# Patient Record
Sex: FEMALE | Race: WHITE | NOT HISPANIC OR LATINO | Employment: OTHER | URBAN - METROPOLITAN AREA
[De-identification: names, ages, dates, MRNs, and addresses within clinical notes are randomized per-mention and may not be internally consistent; named-entity substitution may affect disease eponyms.]

---

## 2018-07-03 LAB
CHOLEST SERPL-MSCNC: 161 MG/DL (ref 0–200)
CHOLESTEROL/HDL RATIO SCREEN: 2.3
HDLC SERPL-MCNC: 71 MG/DL (ref 35–70)
LDL/HDL RATIO: 1.1
LDLC SERPL CALC-MCNC: 76 MG/DL (ref 0–160)
NON HDL CHOL (CALC): 90
TRIGL SERPL-MCNC: 68 MG/DL (ref 40–160)

## 2019-06-25 DIAGNOSIS — Z79.899 LONG TERM USE OF DRUG: ICD-10-CM

## 2019-06-25 DIAGNOSIS — E03.9 HYPOTHYROIDISM, UNSPECIFIED TYPE: Primary | ICD-10-CM

## 2019-06-25 PROBLEM — M19.90 OSTEOARTHRITIS: Status: ACTIVE | Noted: 2019-06-25

## 2019-06-25 PROBLEM — K57.30 DIVERTICULAR DISEASE OF COLON: Status: ACTIVE | Noted: 2019-06-25

## 2019-06-25 PROBLEM — G47.00 INSOMNIA: Status: ACTIVE | Noted: 2019-06-25

## 2019-06-25 PROBLEM — F41.1 ANXIETY STATE: Status: ACTIVE | Noted: 2019-06-25

## 2019-06-25 PROBLEM — I65.29 CAROTID ARTERY OCCLUSION: Status: ACTIVE | Noted: 2019-06-25

## 2019-06-25 PROBLEM — H40.9 GLAUCOMA: Status: ACTIVE | Noted: 2019-06-25

## 2019-06-26 LAB
ALBUMIN SERPL-MCNC: 4.5 G/DL (ref 3.6–5.1)
ALBUMIN/GLOB SERPL: 2.1 (CALC) (ref 1–2.5)
ALP SERPL-CCNC: 58 U/L (ref 33–130)
ALT SERPL-CCNC: 35 U/L (ref 6–29)
AST SERPL-CCNC: 25 U/L (ref 10–35)
BASOPHILS # BLD AUTO: 37 CELLS/UL (ref 0–200)
BASOPHILS NFR BLD AUTO: 0.6 %
BILIRUB SERPL-MCNC: 0.6 MG/DL (ref 0.2–1.2)
BUN SERPL-MCNC: 14 MG/DL (ref 7–25)
BUN/CREAT SERPL: ABNORMAL (CALC) (ref 6–22)
CALCIUM SERPL-MCNC: 9.7 MG/DL (ref 8.6–10.4)
CHLORIDE SERPL-SCNC: 103 MMOL/L (ref 98–110)
CO2 SERPL-SCNC: 33 MMOL/L (ref 20–32)
CREAT SERPL-MCNC: 0.77 MG/DL (ref 0.6–0.93)
EOSINOPHIL # BLD AUTO: 81 CELLS/UL (ref 15–500)
EOSINOPHIL NFR BLD AUTO: 1.3 %
ERYTHROCYTE [DISTWIDTH] IN BLOOD BY AUTOMATED COUNT: 12.9 % (ref 11–15)
GFRSERPLBLD MDRD-ARVRAT: 76 ML/MIN/1.73M2
GLOBULIN SER CALC-MCNC: 2.1 G/DL (CALC) (ref 1.9–3.7)
GLUCOSE SERPL-MCNC: 98 MG/DL (ref 65–99)
HCT VFR BLD AUTO: 44.3 % (ref 35–45)
HGB BLD-MCNC: 14.9 G/DL (ref 11.7–15.5)
LYMPHOCYTES # BLD AUTO: 2114 CELLS/UL (ref 850–3900)
LYMPHOCYTES NFR BLD AUTO: 34.1 %
MCH RBC QN AUTO: 29.9 PG (ref 27–33)
MCHC RBC AUTO-ENTMCNC: 33.6 G/DL (ref 32–36)
MCV RBC AUTO: 88.8 FL (ref 80–100)
MONOCYTES # BLD AUTO: 713 CELLS/UL (ref 200–950)
MONOCYTES NFR BLD AUTO: 11.5 %
NEUTROPHILS # BLD AUTO: 3255 CELLS/UL (ref 1500–7800)
NEUTROPHILS NFR BLD AUTO: 52.5 %
PLATELET # BLD AUTO: 353 THOUSAND/UL (ref 140–400)
PMV BLD REES-ECKER: 9.3 FL (ref 7.5–12.5)
POTASSIUM SERPL-SCNC: 4.6 MMOL/L (ref 3.5–5.3)
PROT SERPL-MCNC: 6.6 G/DL (ref 6.1–8.1)
RBC # BLD AUTO: 4.99 MILLION/UL (ref 3.8–5.1)
SODIUM SERPL-SCNC: 141 MMOL/L (ref 135–146)
TSH SERPL-ACNC: 2.58 MIU/L (ref 0.4–4.5)
WBC # BLD AUTO: 6.2 THOUSAND/UL (ref 3.8–10.8)

## 2019-07-02 ENCOUNTER — OFFICE VISIT (OUTPATIENT)
Dept: PRIMARY CARE CLINIC | Facility: CLINIC | Age: 74
End: 2019-07-02
Payer: MEDICARE

## 2019-07-02 VITALS
BODY MASS INDEX: 27.78 KG/M2 | HEIGHT: 68 IN | DIASTOLIC BLOOD PRESSURE: 78 MMHG | SYSTOLIC BLOOD PRESSURE: 110 MMHG | WEIGHT: 183.31 LBS

## 2019-07-02 DIAGNOSIS — G47.00 INSOMNIA, UNSPECIFIED TYPE: ICD-10-CM

## 2019-07-02 DIAGNOSIS — E03.9 HYPOTHYROIDISM, UNSPECIFIED TYPE: Primary | ICD-10-CM

## 2019-07-02 DIAGNOSIS — I10 ESSENTIAL HYPERTENSION: ICD-10-CM

## 2019-07-02 PROCEDURE — 99214 PR OFFICE/OUTPT VISIT, EST, LEVL IV, 30-39 MIN: ICD-10-PCS | Mod: S$GLB,,, | Performed by: FAMILY MEDICINE

## 2019-07-02 PROCEDURE — 99214 OFFICE O/P EST MOD 30 MIN: CPT | Mod: S$GLB,,, | Performed by: FAMILY MEDICINE

## 2019-07-02 RX ORDER — LISINOPRIL 20 MG/1
TABLET ORAL
COMMUNITY
Start: 2019-04-22 | End: 2020-01-15

## 2019-07-02 RX ORDER — LEVOTHYROXINE SODIUM 125 UG/1
125 TABLET ORAL
COMMUNITY
Start: 2019-07-01

## 2019-07-02 NOTE — PROGRESS NOTES
Subjective:       Patient ID: Grace Arrieta is a 74 y.o. female.    Chief Complaint: Annual Exam    Patient presents for follow-up on her chronic conditions.  She has history hypothyroidism.  This has been well controlled on levothyroxine without side effects.  Also with history of hypertension.  This has been well controlled on lisinopril without side effects.  She is here for recheck.  Also with history of insomnia.  She has not wanted to take any medications for this and this comes and goes.  She recently lost her  to cancer and this has worsened her insomnia.  She has history of anxiety as well.  She states she has much social support with friends and family.  She think she is doing fairly well.      Review of Systems   Constitutional: Negative for chills, fatigue, fever and unexpected weight change.   Eyes: Negative for visual disturbance.   Respiratory: Negative for cough, shortness of breath and wheezing.    Cardiovascular: Negative for chest pain and palpitations.   Gastrointestinal: Negative for abdominal pain and blood in stool.   Genitourinary: Negative for difficulty urinating and dysuria.   Musculoskeletal: Negative for back pain.   Skin: Negative for rash.   Neurological: Negative for dizziness, syncope, light-headedness, numbness and headaches.   Hematological: Negative for adenopathy.   Psychiatric/Behavioral: Positive for sleep disturbance. Negative for dysphoric mood. The patient is not nervous/anxious.        Objective:      Physical Exam   Constitutional: She is oriented to person, place, and time. She appears well-developed and well-nourished.   HENT:   Head: Normocephalic and atraumatic.   Eyes: Conjunctivae and EOM are normal.   Neck: Neck supple. No thyromegaly present.   Cardiovascular: Normal rate, regular rhythm and intact distal pulses.   No murmur heard.  Pulmonary/Chest: Effort normal and breath sounds normal.   Abdominal: Soft. Bowel sounds are normal. She exhibits no mass.  There is no tenderness. There is no rebound and no guarding.   Musculoskeletal: Normal range of motion.   Neurological: She is alert and oriented to person, place, and time.   Skin: Skin is dry. No rash noted.   Psychiatric: She has a normal mood and affect.   Vitals reviewed.      Assessment:       1. Hypothyroidism, unspecified type    2. Essential hypertension    3. Insomnia, unspecified type        Plan:       Hypothyroidism, unspecified type    Essential hypertension    Insomnia, unspecified type              DISCUSSION:  Labs are good.  Continue medications.  She does not want any other medications at it at this time.

## 2020-01-15 RX ORDER — LISINOPRIL 20 MG/1
TABLET ORAL
Qty: 90 TABLET | Refills: 3 | Status: SHIPPED | OUTPATIENT
Start: 2020-01-15 | End: 2020-11-30

## 2020-04-08 ENCOUNTER — PATIENT OUTREACH (OUTPATIENT)
Dept: ADMINISTRATIVE | Facility: OTHER | Age: 75
End: 2020-04-08

## 2020-06-30 DIAGNOSIS — E03.9 HYPOTHYROIDISM, UNSPECIFIED TYPE: Primary | ICD-10-CM

## 2020-06-30 DIAGNOSIS — I10 ESSENTIAL HYPERTENSION: ICD-10-CM

## 2020-07-01 LAB
ALBUMIN SERPL-MCNC: 4.2 G/DL (ref 3.6–5.1)
ALBUMIN/GLOB SERPL: 1.9 (CALC) (ref 1–2.5)
ALP SERPL-CCNC: 56 U/L (ref 37–153)
ALT SERPL-CCNC: 21 U/L (ref 6–29)
AST SERPL-CCNC: 16 U/L (ref 10–35)
BASOPHILS # BLD AUTO: 41 CELLS/UL (ref 0–200)
BASOPHILS NFR BLD AUTO: 0.6 %
BILIRUB SERPL-MCNC: 0.5 MG/DL (ref 0.2–1.2)
BUN SERPL-MCNC: 20 MG/DL (ref 7–25)
BUN/CREAT SERPL: NORMAL (CALC) (ref 6–22)
CALCIUM SERPL-MCNC: 9.5 MG/DL (ref 8.6–10.4)
CHLORIDE SERPL-SCNC: 106 MMOL/L (ref 98–110)
CHOLEST SERPL-MCNC: 170 MG/DL
CHOLEST/HDLC SERPL: 2.9 (CALC)
CO2 SERPL-SCNC: 28 MMOL/L (ref 20–32)
CREAT SERPL-MCNC: 0.71 MG/DL (ref 0.6–0.93)
EOSINOPHIL # BLD AUTO: 159 CELLS/UL (ref 15–500)
EOSINOPHIL NFR BLD AUTO: 2.3 %
ERYTHROCYTE [DISTWIDTH] IN BLOOD BY AUTOMATED COUNT: 12.8 % (ref 11–15)
GFRSERPLBLD MDRD-ARVRAT: 83 ML/MIN/1.73M2
GLOBULIN SER CALC-MCNC: 2.2 G/DL (CALC) (ref 1.9–3.7)
GLUCOSE SERPL-MCNC: 93 MG/DL (ref 65–99)
HCT VFR BLD AUTO: 44 % (ref 35–45)
HDLC SERPL-MCNC: 58 MG/DL
HGB BLD-MCNC: 14.5 G/DL (ref 11.7–15.5)
LDLC SERPL CALC-MCNC: 89 MG/DL (CALC)
LYMPHOCYTES # BLD AUTO: 1946 CELLS/UL (ref 850–3900)
LYMPHOCYTES NFR BLD AUTO: 28.2 %
MCH RBC QN AUTO: 30.1 PG (ref 27–33)
MCHC RBC AUTO-ENTMCNC: 33 G/DL (ref 32–36)
MCV RBC AUTO: 91.5 FL (ref 80–100)
MONOCYTES # BLD AUTO: 731 CELLS/UL (ref 200–950)
MONOCYTES NFR BLD AUTO: 10.6 %
NEUTROPHILS # BLD AUTO: 4023 CELLS/UL (ref 1500–7800)
NEUTROPHILS NFR BLD AUTO: 58.3 %
NONHDLC SERPL-MCNC: 112 MG/DL (CALC)
PLATELET # BLD AUTO: 335 THOUSAND/UL (ref 140–400)
PMV BLD REES-ECKER: 9 FL (ref 7.5–12.5)
POTASSIUM SERPL-SCNC: 4.6 MMOL/L (ref 3.5–5.3)
PROT SERPL-MCNC: 6.4 G/DL (ref 6.1–8.1)
RBC # BLD AUTO: 4.81 MILLION/UL (ref 3.8–5.1)
SODIUM SERPL-SCNC: 141 MMOL/L (ref 135–146)
TRIGL SERPL-MCNC: 130 MG/DL
TSH SERPL-ACNC: 3.23 MIU/L (ref 0.4–4.5)
WBC # BLD AUTO: 6.9 THOUSAND/UL (ref 3.8–10.8)

## 2020-07-07 ENCOUNTER — OFFICE VISIT (OUTPATIENT)
Dept: PRIMARY CARE CLINIC | Facility: CLINIC | Age: 75
End: 2020-07-07
Payer: MEDICARE

## 2020-07-07 VITALS
TEMPERATURE: 98 F | HEIGHT: 68 IN | HEART RATE: 56 BPM | OXYGEN SATURATION: 98 % | DIASTOLIC BLOOD PRESSURE: 64 MMHG | BODY MASS INDEX: 27.43 KG/M2 | SYSTOLIC BLOOD PRESSURE: 108 MMHG | RESPIRATION RATE: 18 BRPM | WEIGHT: 181 LBS

## 2020-07-07 DIAGNOSIS — E03.9 HYPOTHYROIDISM, UNSPECIFIED TYPE: ICD-10-CM

## 2020-07-07 DIAGNOSIS — I10 ESSENTIAL HYPERTENSION: Primary | ICD-10-CM

## 2020-07-07 DIAGNOSIS — G47.00 INSOMNIA, UNSPECIFIED TYPE: ICD-10-CM

## 2020-07-07 PROCEDURE — 99214 OFFICE O/P EST MOD 30 MIN: CPT | Mod: S$GLB,,, | Performed by: FAMILY MEDICINE

## 2020-07-07 PROCEDURE — 99214 PR OFFICE/OUTPT VISIT, EST, LEVL IV, 30-39 MIN: ICD-10-PCS | Mod: S$GLB,,, | Performed by: FAMILY MEDICINE

## 2020-07-07 NOTE — PROGRESS NOTES
Subjective:       Patient ID: Grace Arrieta is a 75 y.o. female.    Chief Complaint: Annual Exam    Patient presents for follow-up on her chronic conditions.  She has history of hypertension.  She takes lisinopril 20 mg for this without side effects.  She is here for recheck.  Also with history of hypothyroidism.  She takes 125 mcg daily for this of levothyroxine.  She denies any side effects and thinks it is working well.  Also with history of insomnia.  She takes nothing for this because she is afraid of the side effects.  She states that she tosses and turns a lot.  She thinks this may be related takes and pains that she has in her hips and shoulders while sleeping.  She lost her  was 2 years ago now and is overall doing well but she still is adjusting to life alone.  She has many friends and family and she is very active.  She eats out at restaurants all the time with other people.  She has gained a little weight with this.      Review of Systems   Constitutional: Negative for chills, fatigue, fever and unexpected weight change.   Eyes: Negative for visual disturbance.   Respiratory: Negative for cough, shortness of breath and wheezing.    Cardiovascular: Negative for chest pain and palpitations.   Gastrointestinal: Negative for abdominal pain and blood in stool.   Genitourinary: Negative for difficulty urinating and dysuria.   Musculoskeletal: Positive for arthralgias. Negative for back pain.   Skin: Negative for rash.   Neurological: Negative for dizziness, syncope, light-headedness, numbness and headaches.   Hematological: Negative for adenopathy.   Psychiatric/Behavioral: Positive for sleep disturbance. Negative for dysphoric mood. The patient is not nervous/anxious.      Medication List with Changes/Refills   Current Medications    LEVOTHYROXINE (SYNTHROID) 125 MCG TABLET        LISINOPRIL (PRINIVIL,ZESTRIL) 20 MG TABLET    TAKE 1 TABLET BY MOUTH EVERY DAY         Objective:      /64 (BP  "Location: Left arm, Patient Position: Sitting, BP Method: Medium (Manual))   Pulse (!) 56   Temp 97.5 °F (36.4 °C)   Resp 18   Ht 5' 8" (1.727 m)   Wt 82.1 kg (181 lb)   SpO2 98%   BMI 27.52 kg/m²   Estimated body mass index is 27.52 kg/m² as calculated from the following:    Height as of this encounter: 5' 8" (1.727 m).    Weight as of this encounter: 82.1 kg (181 lb).  Physical Exam  Vitals signs reviewed.   Constitutional:       Appearance: Normal appearance. She is well-developed.   HENT:      Head: Normocephalic and atraumatic.   Eyes:      Conjunctiva/sclera: Conjunctivae normal.   Neck:      Musculoskeletal: Neck supple.      Thyroid: No thyromegaly.   Cardiovascular:      Rate and Rhythm: Normal rate and regular rhythm.      Heart sounds: No murmur.   Pulmonary:      Effort: Pulmonary effort is normal.      Breath sounds: Normal breath sounds.   Abdominal:      General: Bowel sounds are normal.      Palpations: Abdomen is soft. There is no mass.      Tenderness: There is no abdominal tenderness. There is no guarding or rebound.   Musculoskeletal: Normal range of motion.   Skin:     General: Skin is dry.      Findings: No rash.   Neurological:      Mental Status: She is alert and oriented to person, place, and time.   Psychiatric:         Mood and Affect: Mood normal.         Behavior: Behavior normal.         Thought Content: Thought content normal.         Judgment: Judgment normal.         Assessment:       Problem List Items Addressed This Visit        Cardiac/Vascular    Essential hypertension - Primary       Endocrine    Hypothyroidism       Other    Insomnia            Plan:       Essential hypertension    Hypothyroidism, unspecified type    Insomnia, unspecified type              DISCUSSION:  Labs look great.  Healthy diet and exercise discussed.  Continue to go out and do things with friends and family.  I feel overall she is doing very well.  Continue current medications.  Return to clinic in " 1 year.      Results for orders placed or performed in visit on 06/30/20   TSH   Result Value Ref Range    TSH 3.23 0.40 - 4.50 mIU/L   Comprehensive metabolic panel   Result Value Ref Range    Glucose 93 65 - 99 mg/dL    BUN, Bld 20 7 - 25 mg/dL    Creatinine 0.71 0.60 - 0.93 mg/dL    eGFR if non  83 > OR = 60 mL/min/1.73m2    eGFR if African American 96 > OR = 60 mL/min/1.73m2    BUN/Creatinine Ratio NOT APPLICABLE 6 - 22 (calc)    Sodium 141 135 - 146 mmol/L    Potassium 4.6 3.5 - 5.3 mmol/L    Chloride 106 98 - 110 mmol/L    CO2 28 20 - 32 mmol/L    Calcium 9.5 8.6 - 10.4 mg/dL    Total Protein 6.4 6.1 - 8.1 g/dL    Albumin 4.2 3.6 - 5.1 g/dL    Globulin, Total 2.2 1.9 - 3.7 g/dL (calc)    Albumin/Globulin Ratio 1.9 1.0 - 2.5 (calc)    Total Bilirubin 0.5 0.2 - 1.2 mg/dL    Alkaline Phosphatase 56 37 - 153 U/L    AST 16 10 - 35 U/L    ALT 21 6 - 29 U/L   CBC auto differential   Result Value Ref Range    WBC 6.9 3.8 - 10.8 Thousand/uL    RBC 4.81 3.80 - 5.10 Million/uL    Hemoglobin 14.5 11.7 - 15.5 g/dL    Hematocrit 44.0 35.0 - 45.0 %    Mean Corpuscular Volume 91.5 80.0 - 100.0 fL    Mean Corpuscular Hemoglobin 30.1 27.0 - 33.0 pg    Mean Corpuscular Hemoglobin Conc 33.0 32.0 - 36.0 g/dL    RDW 12.8 11.0 - 15.0 %    Platelets 335 140 - 400 Thousand/uL    MPV 9.0 7.5 - 12.5 fL    Neutrophils Absolute 4,023 1,500 - 7,800 cells/uL    Lymph # 1,946 850 - 3,900 cells/uL    Mono # 731 200 - 950 cells/uL    Eos # 159 15 - 500 cells/uL    Baso # 41 0 - 200 cells/uL    Neutrophils Relative 58.3 %    Lymph% 28.2 %    Mono% 10.6 %    Eosinophil% 2.3 %    Basophil% 0.6 %   Lipid panel   Result Value Ref Range    Cholesterol 170 <200 mg/dL    HDL 58 > OR = 50 mg/dL    Triglycerides 130 <150 mg/dL    LDL Cholesterol 89 mg/dL (calc)    Hdl/Cholesterol Ratio 2.9 <5.0 (calc)    Non HDL Chol. (LDL+VLDL) 112 <130 mg/dL (calc)

## 2021-04-19 ENCOUNTER — OFFICE VISIT (OUTPATIENT)
Dept: PRIMARY CARE CLINIC | Facility: CLINIC | Age: 76
End: 2021-04-19
Payer: MEDICARE

## 2021-04-19 VITALS
HEART RATE: 73 BPM | OXYGEN SATURATION: 99 % | SYSTOLIC BLOOD PRESSURE: 135 MMHG | RESPIRATION RATE: 18 BRPM | WEIGHT: 184.38 LBS | BODY MASS INDEX: 27.94 KG/M2 | HEIGHT: 68 IN | DIASTOLIC BLOOD PRESSURE: 64 MMHG

## 2021-04-19 DIAGNOSIS — R07.9 CHEST PAIN, UNSPECIFIED TYPE: Primary | ICD-10-CM

## 2021-04-19 DIAGNOSIS — E03.9 HYPOTHYROIDISM, UNSPECIFIED TYPE: ICD-10-CM

## 2021-04-19 DIAGNOSIS — I10 ESSENTIAL HYPERTENSION: ICD-10-CM

## 2021-04-19 DIAGNOSIS — K21.9 GASTROESOPHAGEAL REFLUX DISEASE, UNSPECIFIED WHETHER ESOPHAGITIS PRESENT: ICD-10-CM

## 2021-04-19 DIAGNOSIS — F41.9 ANXIETY: ICD-10-CM

## 2021-04-19 PROCEDURE — 99214 PR OFFICE/OUTPT VISIT, EST, LEVL IV, 30-39 MIN: ICD-10-PCS | Mod: S$GLB,,, | Performed by: FAMILY MEDICINE

## 2021-04-19 PROCEDURE — 99214 OFFICE O/P EST MOD 30 MIN: CPT | Mod: S$GLB,,, | Performed by: FAMILY MEDICINE

## 2021-04-19 RX ORDER — CLONAZEPAM 1 MG/1
1 TABLET ORAL DAILY
Qty: 30 TABLET | Refills: 5 | Status: SHIPPED | OUTPATIENT
Start: 2021-04-19 | End: 2022-10-31

## 2021-04-20 RX ORDER — OMEPRAZOLE 20 MG/1
20 CAPSULE, DELAYED RELEASE ORAL DAILY
Qty: 90 CAPSULE | Refills: 3 | Status: SHIPPED | OUTPATIENT
Start: 2021-04-20 | End: 2022-03-14

## 2021-04-20 RX ORDER — OMEPRAZOLE 20 MG/1
20 CAPSULE, DELAYED RELEASE ORAL DAILY
COMMUNITY
End: 2021-04-20 | Stop reason: SDUPTHER

## 2021-10-12 DIAGNOSIS — E78.2 MIXED HYPERLIPIDEMIA: ICD-10-CM

## 2021-10-12 DIAGNOSIS — I10 ESSENTIAL HYPERTENSION: Primary | ICD-10-CM

## 2021-10-12 DIAGNOSIS — E03.9 HYPOTHYROIDISM, UNSPECIFIED TYPE: ICD-10-CM

## 2021-10-13 LAB
ALBUMIN SERPL-MCNC: 4.3 G/DL (ref 3.6–5.1)
ALBUMIN/GLOB SERPL: 1.9 (CALC) (ref 1–2.5)
ALP SERPL-CCNC: 67 U/L (ref 37–153)
ALT SERPL-CCNC: 22 U/L (ref 6–29)
AST SERPL-CCNC: 17 U/L (ref 10–35)
BASOPHILS # BLD AUTO: 43 CELLS/UL (ref 0–200)
BASOPHILS NFR BLD AUTO: 0.6 %
BILIRUB SERPL-MCNC: 0.6 MG/DL (ref 0.2–1.2)
BUN SERPL-MCNC: 20 MG/DL (ref 7–25)
BUN/CREAT SERPL: NORMAL (CALC) (ref 6–22)
CALCIUM SERPL-MCNC: 9.6 MG/DL (ref 8.6–10.4)
CHLORIDE SERPL-SCNC: 105 MMOL/L (ref 98–110)
CHOLEST SERPL-MCNC: 159 MG/DL
CHOLEST/HDLC SERPL: 2.6 (CALC)
CO2 SERPL-SCNC: 27 MMOL/L (ref 20–32)
CREAT SERPL-MCNC: 0.82 MG/DL (ref 0.6–0.93)
EOSINOPHIL # BLD AUTO: 121 CELLS/UL (ref 15–500)
EOSINOPHIL NFR BLD AUTO: 1.7 %
ERYTHROCYTE [DISTWIDTH] IN BLOOD BY AUTOMATED COUNT: 13.3 % (ref 11–15)
GLOBULIN SER CALC-MCNC: 2.3 G/DL (CALC) (ref 1.9–3.7)
GLUCOSE SERPL-MCNC: 96 MG/DL (ref 65–99)
HCT VFR BLD AUTO: 44.5 % (ref 35–45)
HDLC SERPL-MCNC: 61 MG/DL
HGB BLD-MCNC: 14.4 G/DL (ref 11.7–15.5)
LDLC SERPL CALC-MCNC: 78 MG/DL (CALC)
LYMPHOCYTES # BLD AUTO: 1917 CELLS/UL (ref 850–3900)
LYMPHOCYTES NFR BLD AUTO: 27 %
MCH RBC QN AUTO: 29.6 PG (ref 27–33)
MCHC RBC AUTO-ENTMCNC: 32.4 G/DL (ref 32–36)
MCV RBC AUTO: 91.4 FL (ref 80–100)
MONOCYTES # BLD AUTO: 788 CELLS/UL (ref 200–950)
MONOCYTES NFR BLD AUTO: 11.1 %
NEUTROPHILS # BLD AUTO: 4232 CELLS/UL (ref 1500–7800)
NEUTROPHILS NFR BLD AUTO: 59.6 %
NONHDLC SERPL-MCNC: 98 MG/DL (CALC)
PLATELET # BLD AUTO: 328 THOUSAND/UL (ref 140–400)
PMV BLD REES-ECKER: 9.4 FL (ref 7.5–12.5)
POTASSIUM SERPL-SCNC: 4.6 MMOL/L (ref 3.5–5.3)
PROT SERPL-MCNC: 6.6 G/DL (ref 6.1–8.1)
RBC # BLD AUTO: 4.87 MILLION/UL (ref 3.8–5.1)
SODIUM SERPL-SCNC: 140 MMOL/L (ref 135–146)
TRIGL SERPL-MCNC: 115 MG/DL
TSH SERPL-ACNC: 2.13 MIU/L (ref 0.4–4.5)
WBC # BLD AUTO: 7.1 THOUSAND/UL (ref 3.8–10.8)

## 2021-10-19 ENCOUNTER — OFFICE VISIT (OUTPATIENT)
Dept: PRIMARY CARE CLINIC | Facility: CLINIC | Age: 76
End: 2021-10-19
Payer: MEDICARE

## 2021-10-19 VITALS
TEMPERATURE: 97 F | HEIGHT: 68 IN | BODY MASS INDEX: 28.64 KG/M2 | RESPIRATION RATE: 16 BRPM | DIASTOLIC BLOOD PRESSURE: 80 MMHG | WEIGHT: 189 LBS | SYSTOLIC BLOOD PRESSURE: 142 MMHG | OXYGEN SATURATION: 99 % | HEART RATE: 57 BPM

## 2021-10-19 DIAGNOSIS — K21.9 GASTROESOPHAGEAL REFLUX DISEASE, UNSPECIFIED WHETHER ESOPHAGITIS PRESENT: ICD-10-CM

## 2021-10-19 DIAGNOSIS — E03.9 HYPOTHYROIDISM, UNSPECIFIED TYPE: ICD-10-CM

## 2021-10-19 DIAGNOSIS — F41.9 ANXIETY: ICD-10-CM

## 2021-10-19 DIAGNOSIS — E78.2 MIXED HYPERLIPIDEMIA: ICD-10-CM

## 2021-10-19 DIAGNOSIS — I10 ESSENTIAL HYPERTENSION: Primary | ICD-10-CM

## 2021-10-19 PROCEDURE — 99214 PR OFFICE/OUTPT VISIT, EST, LEVL IV, 30-39 MIN: ICD-10-PCS | Mod: 25,S$GLB,, | Performed by: FAMILY MEDICINE

## 2021-10-19 PROCEDURE — G0008 FLU VACCINE - QUADRIVALENT - ADJUVANTED: ICD-10-PCS | Mod: S$GLB,,, | Performed by: FAMILY MEDICINE

## 2021-10-19 PROCEDURE — 90694 VACC AIIV4 NO PRSRV 0.5ML IM: CPT | Mod: S$GLB,,, | Performed by: FAMILY MEDICINE

## 2021-10-19 PROCEDURE — 90694 FLU VACCINE - QUADRIVALENT - ADJUVANTED: ICD-10-PCS | Mod: S$GLB,,, | Performed by: FAMILY MEDICINE

## 2021-10-19 PROCEDURE — 99214 OFFICE O/P EST MOD 30 MIN: CPT | Mod: 25,S$GLB,, | Performed by: FAMILY MEDICINE

## 2021-10-19 PROCEDURE — G0008 ADMIN INFLUENZA VIRUS VAC: HCPCS | Mod: S$GLB,,, | Performed by: FAMILY MEDICINE

## 2021-11-01 ENCOUNTER — CLINICAL SUPPORT (OUTPATIENT)
Dept: PRIMARY CARE CLINIC | Facility: CLINIC | Age: 76
End: 2021-11-01
Payer: MEDICARE

## 2021-11-01 DIAGNOSIS — Z23 NEED FOR PNEUMOCOCCAL VACCINE: Primary | ICD-10-CM

## 2021-11-01 PROCEDURE — 90732 PPSV23 VACC 2 YRS+ SUBQ/IM: CPT | Mod: S$GLB,,, | Performed by: FAMILY MEDICINE

## 2021-11-01 PROCEDURE — G0009 PNEUMOCOCCAL POLYSACCHARIDE VACCINE 23-VALENT =>2YO SQ IM: ICD-10-PCS | Mod: S$GLB,,, | Performed by: FAMILY MEDICINE

## 2021-11-01 PROCEDURE — 90732 PNEUMOCOCCAL POLYSACCHARIDE VACCINE 23-VALENT =>2YO SQ IM: ICD-10-PCS | Mod: S$GLB,,, | Performed by: FAMILY MEDICINE

## 2021-11-01 PROCEDURE — G0009 ADMIN PNEUMOCOCCAL VACCINE: HCPCS | Mod: S$GLB,,, | Performed by: FAMILY MEDICINE

## 2022-02-23 ENCOUNTER — OFFICE VISIT (OUTPATIENT)
Dept: PRIMARY CARE CLINIC | Facility: CLINIC | Age: 77
End: 2022-02-23
Payer: MEDICARE

## 2022-02-23 VITALS
OXYGEN SATURATION: 98 % | BODY MASS INDEX: 28.49 KG/M2 | HEIGHT: 68 IN | HEART RATE: 61 BPM | RESPIRATION RATE: 18 BRPM | WEIGHT: 188 LBS | DIASTOLIC BLOOD PRESSURE: 82 MMHG | SYSTOLIC BLOOD PRESSURE: 170 MMHG

## 2022-02-23 DIAGNOSIS — M54.41 ACUTE RIGHT-SIDED LOW BACK PAIN WITH RIGHT-SIDED SCIATICA: ICD-10-CM

## 2022-02-23 DIAGNOSIS — I10 ESSENTIAL HYPERTENSION: Primary | ICD-10-CM

## 2022-02-23 PROCEDURE — 99214 OFFICE O/P EST MOD 30 MIN: CPT | Mod: S$GLB,,, | Performed by: FAMILY MEDICINE

## 2022-02-23 PROCEDURE — 99214 PR OFFICE/OUTPT VISIT, EST, LEVL IV, 30-39 MIN: ICD-10-PCS | Mod: S$GLB,,, | Performed by: FAMILY MEDICINE

## 2022-02-23 RX ORDER — METHYLPREDNISOLONE 4 MG/1
TABLET ORAL
COMMUNITY
Start: 2022-02-21 | End: 2022-10-31

## 2022-02-23 RX ORDER — MELOXICAM 7.5 MG/1
TABLET ORAL
COMMUNITY
Start: 2022-02-21 | End: 2022-10-31

## 2022-02-23 NOTE — PROGRESS NOTES
Subjective:       Patient ID: Grace Arrieta is a 77 y.o. female.    Chief Complaint: Hospital Follow Up    Patient presents as a follow-up from ER visit due to elevated blood pressure.  She states that they gave her something to bring it down.  They told her to follow-up here.  She denies any chest pain shortness of breath headache passing out etcetera.  She states it has been better while at home but still elevated.  It is high here today.  The ER prescribed her lisinopril 20 mg. She denies any side effects.  She does not feel extra anxious, though she is still sad following the death of her  last year.  She has been having pain in her right leg that she feels is sciatica.  It begins in her right buttock and radiates down the leg.  This has been coming and going over the last week.  It worse when she sits for long periods.  She carries a soft pillow with her and uses this, as it helps.  No trauma recalled.    Review of Systems   Constitutional: Negative for chills, fatigue, fever and unexpected weight change.   Eyes: Negative for visual disturbance.   Respiratory: Negative for cough, shortness of breath and wheezing.    Cardiovascular: Negative for chest pain and palpitations.   Gastrointestinal: Negative for abdominal pain and blood in stool.   Genitourinary: Negative for difficulty urinating and dysuria.   Musculoskeletal: Positive for arthralgias (Right leg pain) and back pain (Right buttock and radiates down right leg.).   Skin: Negative for rash.   Neurological: Negative for dizziness, syncope, light-headedness, numbness and headaches.   Hematological: Negative for adenopathy.   Psychiatric/Behavioral: Negative for dysphoric mood. The patient is not nervous/anxious.      Medication List with Changes/Refills   Current Medications    CLONAZEPAM (KLONOPIN) 1 MG TABLET    Take 1 tablet (1 mg total) by mouth once daily. prn    LEVOTHYROXINE (SYNTHROID) 125 MCG TABLET    Take 125 mcg by mouth before  "breakfast.     LISINOPRIL (PRINIVIL,ZESTRIL) 20 MG TABLET    TAKE 1 TABLET BY MOUTH EVERY DAY    MELOXICAM (MOBIC) 7.5 MG TABLET        METHYLPREDNISOLONE (MEDROL DOSEPACK) 4 MG TABLET        OMEPRAZOLE (PRILOSEC) 20 MG CAPSULE    Take 1 capsule (20 mg total) by mouth once daily.         Objective:      BP (!) 170/82 (BP Location: Right arm, Patient Position: Sitting, BP Method: Large (Manual))   Pulse 61   Resp 18   Ht 5' 8" (1.727 m)   Wt 85.3 kg (188 lb)   SpO2 98%   BMI 28.59 kg/m²   Estimated body mass index is 28.59 kg/m² as calculated from the following:    Height as of this encounter: 5' 8" (1.727 m).    Weight as of this encounter: 85.3 kg (188 lb).  Physical Exam  Vitals reviewed.   Constitutional:       General: She is not in acute distress.     Appearance: Normal appearance. She is well-developed. She is not ill-appearing.   HENT:      Head: Normocephalic and atraumatic.   Eyes:      Conjunctiva/sclera: Conjunctivae normal.   Neck:      Thyroid: No thyromegaly.   Cardiovascular:      Rate and Rhythm: Normal rate and regular rhythm.      Heart sounds: No murmur heard.  Pulmonary:      Effort: Pulmonary effort is normal.      Breath sounds: Normal breath sounds.   Abdominal:      General: Bowel sounds are normal.      Palpations: Abdomen is soft. There is no mass.      Tenderness: There is no abdominal tenderness. There is no guarding or rebound.   Musculoskeletal:         General: Normal range of motion.      Cervical back: Neck supple.   Skin:     General: Skin is dry.      Findings: No rash.   Neurological:      Mental Status: She is alert and oriented to person, place, and time.      Comments: Negative straight leg raises   Psychiatric:         Mood and Affect: Mood normal.         Behavior: Behavior normal.         Thought Content: Thought content normal.         Judgment: Judgment normal.         Assessment:       Problem List Items Addressed This Visit        Cardiac/Vascular    Essential " hypertension - Primary      Other Visit Diagnoses     Acute right-sided low back pain with right-sided sciatica                Plan:       Essential hypertension    Acute right-sided low back pain with right-sided sciatica              DISCUSSION:  Continue to monitor blood pressure.  If these values stay high then contact us and I will change her lisinopril to lisinopril HCT 20/12.5.  Avoid sitting on any hard bench is.  Stretches demonstrated and recommended to perform 2-3 times per day.

## 2022-09-22 ENCOUNTER — PATIENT OUTREACH (OUTPATIENT)
Dept: ADMINISTRATIVE | Facility: HOSPITAL | Age: 77
End: 2022-09-22
Payer: MEDICARE

## 2022-10-24 ENCOUNTER — TELEPHONE (OUTPATIENT)
Dept: FAMILY MEDICINE | Facility: CLINIC | Age: 77
End: 2022-10-24
Payer: MEDICARE

## 2022-10-24 DIAGNOSIS — E78.5 HYPERLIPIDEMIA, UNSPECIFIED HYPERLIPIDEMIA TYPE: ICD-10-CM

## 2022-10-24 DIAGNOSIS — Z79.899 OTHER LONG TERM (CURRENT) DRUG THERAPY: ICD-10-CM

## 2022-10-24 DIAGNOSIS — I10 ESSENTIAL HYPERTENSION: Primary | ICD-10-CM

## 2022-10-24 DIAGNOSIS — R53.83 FATIGUE, UNSPECIFIED TYPE: ICD-10-CM

## 2022-10-24 DIAGNOSIS — E03.9 HYPOTHYROIDISM, UNSPECIFIED TYPE: ICD-10-CM

## 2022-10-24 DIAGNOSIS — R79.9 ABNORMAL FINDING OF BLOOD CHEMISTRY, UNSPECIFIED: ICD-10-CM

## 2022-10-25 ENCOUNTER — LAB VISIT (OUTPATIENT)
Dept: FAMILY MEDICINE | Facility: CLINIC | Age: 77
End: 2022-10-25
Payer: MEDICARE

## 2022-10-25 DIAGNOSIS — E03.9 HYPOTHYROIDISM, UNSPECIFIED TYPE: ICD-10-CM

## 2022-10-25 DIAGNOSIS — Z23 NEED FOR PROPHYLACTIC VACCINATION AND INOCULATION AGAINST INFLUENZA: Primary | ICD-10-CM

## 2022-10-25 DIAGNOSIS — I10 ESSENTIAL HYPERTENSION: ICD-10-CM

## 2022-10-25 DIAGNOSIS — E78.5 HYPERLIPIDEMIA, UNSPECIFIED HYPERLIPIDEMIA TYPE: ICD-10-CM

## 2022-10-25 DIAGNOSIS — Z79.899 OTHER LONG TERM (CURRENT) DRUG THERAPY: ICD-10-CM

## 2022-10-25 DIAGNOSIS — R79.9 ABNORMAL FINDING OF BLOOD CHEMISTRY, UNSPECIFIED: ICD-10-CM

## 2022-10-25 DIAGNOSIS — R53.83 FATIGUE, UNSPECIFIED TYPE: ICD-10-CM

## 2022-10-25 LAB
ABS NRBC COUNT: 0 X 10 3/UL (ref 0–0.01)
ABSOLUTE BASOPHIL: 0.03 X 10 3/UL (ref 0–0.22)
ABSOLUTE EOSINOPHIL: 0.16 X 10 3/UL (ref 0.04–0.54)
ABSOLUTE IMMATURE GRAN: 0.02 X 10 3/UL (ref 0–0.04)
ABSOLUTE LYMPHOCYTE: 2.28 X 10 3/UL (ref 0.86–4.75)
ABSOLUTE MONOCYTE: 0.82 X 10 3/UL (ref 0.22–1.08)
ALBUMIN SERPL-MCNC: 4.9 G/DL (ref 3.5–5.2)
ALP ISOS SERPL LEV INH-CCNC: 77 U/L (ref 35–105)
ALT (SGPT): 28 U/L (ref 0–33)
ANION GAP SERPL CALC-SCNC: 10 MMOL/L (ref 8–17)
AST SERPL-CCNC: 19 U/L (ref 0–32)
BASOPHILS NFR BLD: 0.4 % (ref 0.2–1.2)
BILIRUB CONJ+UNCONJ SERPL-MCNC: NORMAL MG/DL (ref 0.1–0.8)
BILIRUBIN DIRECT+TOT PNL SERPL-MCNC: <0.2 MG/DL (ref 0–0.3)
BILIRUBIN, TOTAL: 0.42 MG/DL (ref 0–1.2)
BUN/CREAT SERPL: 20.8 (ref 6–20)
CALCIUM SERPL-MCNC: 10.2 MG/DL (ref 8.6–10.2)
CARBON DIOXIDE, CO2: 30 MMOL/L (ref 22–29)
CHLORIDE: 103 MMOL/L (ref 98–107)
CHOLEST SERPL-MSCNC: 170 MG/DL (ref 100–200)
CREAT SERPL-MCNC: 0.77 MG/DL (ref 0.5–0.9)
EOSINOPHIL NFR BLD: 2.2 % (ref 0.7–7)
ESTIMATED AVERAGE GLUCOSE: 107 MG/DL
GFR ESTIMATION: 78.91
GLOBULIN: 1.9 G/DL (ref 1.5–4.5)
GLUCOSE: 95 MG/DL (ref 82–115)
HBA1C MFR BLD: 5.4 % (ref 4–6)
HCT VFR BLD AUTO: 45.9 % (ref 37–47)
HDLC SERPL-MCNC: 58 MG/DL
HGB BLD-MCNC: 15.2 G/DL (ref 12–16)
IMMATURE GRANULOCYTES: 0.3 % (ref 0–0.5)
LDL/HDL RATIO: 1.6 (ref 1–3)
LDLC SERPL CALC-MCNC: 90.6 MG/DL (ref 0–100)
LYMPHOCYTES NFR BLD: 31.2 % (ref 19.3–53.1)
MCH RBC QN AUTO: 29.6 PG (ref 27–32)
MCHC RBC AUTO-ENTMCNC: 33.1 G/DL (ref 32–36)
MCV RBC AUTO: 89.5 FL (ref 82–100)
MONOCYTES NFR BLD: 11.2 % (ref 4.7–12.5)
NEUTROPHILS # BLD AUTO: 4 X 10 3/UL (ref 2.15–7.56)
NEUTROPHILS NFR BLD: 54.7 % (ref 34–71.1)
NUCLEATED RED BLOOD CELLS: 0 /100 WBC (ref 0–0.2)
PLATELET # BLD AUTO: 328 X 10 3/UL (ref 135–400)
POTASSIUM: 4.5 MMOL/L (ref 3.5–5.1)
PROT SNV-MCNC: 6.8 G/DL (ref 6.4–8.3)
RBC # BLD AUTO: 5.13 X 10 6/UL (ref 4.2–5.4)
RDW-SD: 42.7 FL (ref 37–54)
SODIUM: 143 MMOL/L (ref 136–145)
TRIGL SERPL-MCNC: 107 MG/DL (ref 0–150)
TSH SERPL DL<=0.005 MIU/L-ACNC: 2.24 UIU/ML (ref 0.27–4.2)
UREA NITROGEN (BUN): 16 MG/DL (ref 8–23)
VITAMIN D (25OHD): 23 NG/ML
WBC # BLD: 7.31 X 10 3/UL (ref 4.3–10.8)

## 2022-10-25 PROCEDURE — 90694 VACC AIIV4 NO PRSRV 0.5ML IM: CPT | Mod: S$GLB,,, | Performed by: INTERNAL MEDICINE

## 2022-10-25 PROCEDURE — G0008 ADMIN INFLUENZA VIRUS VAC: HCPCS | Mod: S$GLB,,, | Performed by: INTERNAL MEDICINE

## 2022-10-25 PROCEDURE — G0008 FLU VACCINE - QUADRIVALENT - ADJUVANTED: ICD-10-PCS | Mod: S$GLB,,, | Performed by: INTERNAL MEDICINE

## 2022-10-25 PROCEDURE — 90694 FLU VACCINE - QUADRIVALENT - ADJUVANTED: ICD-10-PCS | Mod: S$GLB,,, | Performed by: INTERNAL MEDICINE

## 2022-10-31 ENCOUNTER — OFFICE VISIT (OUTPATIENT)
Dept: FAMILY MEDICINE | Facility: CLINIC | Age: 77
End: 2022-10-31
Payer: MEDICARE

## 2022-10-31 VITALS
OXYGEN SATURATION: 98 % | SYSTOLIC BLOOD PRESSURE: 138 MMHG | TEMPERATURE: 98 F | BODY MASS INDEX: 28.19 KG/M2 | HEART RATE: 70 BPM | DIASTOLIC BLOOD PRESSURE: 82 MMHG | HEIGHT: 68 IN | WEIGHT: 186 LBS

## 2022-10-31 DIAGNOSIS — E03.9 HYPOTHYROIDISM, UNSPECIFIED TYPE: ICD-10-CM

## 2022-10-31 DIAGNOSIS — M19.90 OSTEOARTHRITIS, UNSPECIFIED OSTEOARTHRITIS TYPE, UNSPECIFIED SITE: ICD-10-CM

## 2022-10-31 DIAGNOSIS — E78.5 HYPERLIPIDEMIA, UNSPECIFIED HYPERLIPIDEMIA TYPE: ICD-10-CM

## 2022-10-31 DIAGNOSIS — Z12.31 BREAST CANCER SCREENING BY MAMMOGRAM: ICD-10-CM

## 2022-10-31 DIAGNOSIS — Z00.00 HEALTH MAINTENANCE EXAMINATION: Primary | ICD-10-CM

## 2022-10-31 DIAGNOSIS — I10 ESSENTIAL HYPERTENSION: ICD-10-CM

## 2022-10-31 DIAGNOSIS — R53.83 FATIGUE, UNSPECIFIED TYPE: ICD-10-CM

## 2022-10-31 DIAGNOSIS — R73.01 ELEVATED FASTING GLUCOSE: ICD-10-CM

## 2022-10-31 DIAGNOSIS — E55.9 VITAMIN D DEFICIENCY: ICD-10-CM

## 2022-10-31 PROCEDURE — 99214 PR OFFICE/OUTPT VISIT, EST, LEVL IV, 30-39 MIN: ICD-10-PCS | Mod: S$GLB,,, | Performed by: INTERNAL MEDICINE

## 2022-10-31 PROCEDURE — 99214 OFFICE O/P EST MOD 30 MIN: CPT | Mod: S$GLB,,, | Performed by: INTERNAL MEDICINE

## 2022-10-31 NOTE — PROGRESS NOTES
"  Subjective:       Patient ID: Grace Arrieta is a 77 y.o. female.    Chief Complaint: Establish Care      HPI: Grace comes in today to reestablish.  She has been followed by Dr. Fernandez more recently.  Her  Jasen  about 4 years ago.  She says overall she is doing well but she has gained some weight.  She has history of hypertension and hypothyroidism.  She denies any history of heart troubles.  He says she is up-to-date on her colonoscopy.  We will set her up for mammogram.  She denies chest pains or shortness of breath.       Past Medical History:   Past Medical History:   Diagnosis Date    Hypertension     Thyroid disease        Past Surgical Historical:   Past Surgical History:   Procedure Laterality Date    CHOLECYSTECTOMY      HYSTERECTOMY      TONSILLECTOMY          Vitals: /82   Pulse 70   Temp 97.9 °F (36.6 °C) (Temporal)   Ht 5' 8" (1.727 m)   Wt 84.4 kg (186 lb)   SpO2 98%   BMI 28.28 kg/m²      Medications:   Medication List with Changes/Refills   Current Medications    LEVOTHYROXINE (SYNTHROID) 125 MCG TABLET    Take 125 mcg by mouth before breakfast.     LISINOPRIL (PRINIVIL,ZESTRIL) 20 MG TABLET    TAKE 1 TABLET BY MOUTH EVERY DAY    OMEPRAZOLE (PRILOSEC) 20 MG CAPSULE    TAKE 1 CAPSULE BY MOUTH EVERY DAY   Discontinued Medications    CLONAZEPAM (KLONOPIN) 1 MG TABLET    Take 1 tablet (1 mg total) by mouth once daily. prn    MELOXICAM (MOBIC) 7.5 MG TABLET        METHYLPREDNISOLONE (MEDROL DOSEPACK) 4 MG TABLET            Past Social History:   Social History     Socioeconomic History    Marital status:    Tobacco Use    Smoking status: Former    Smokeless tobacco: Never   Substance and Sexual Activity    Alcohol use: Yes    Drug use: Never    Sexual activity: Not Currently       Allergies: Review of patient's allergies indicates:  No Known Allergies     Family History:   Family History   Problem Relation Age of Onset    Cancer Mother         Review of " Systems:  Review of Systems   Respiratory:  Negative for shortness of breath and wheezing.    Cardiovascular:  Negative for chest pain and palpitations.   Gastrointestinal:  Negative for abdominal pain, change in bowel habit and change in bowel habit.   Musculoskeletal:  Negative for gait problem.   Neurological:  Negative for dizziness and syncope.   Psychiatric/Behavioral:  Negative for suicidal ideas.       Physical Exam:  Physical Exam  Constitutional:       Appearance: Normal appearance.   Cardiovascular:      Rate and Rhythm: Normal rate and regular rhythm.   Pulmonary:      Effort: Pulmonary effort is normal.      Breath sounds: Normal breath sounds.   Abdominal:      General: Abdomen is flat.      Palpations: Abdomen is soft.   Skin:     General: Skin is warm and dry.   Neurological:      General: No focal deficit present.      Mental Status: She is alert and oriented to person, place, and time.   Psychiatric:         Mood and Affect: Mood normal.        Labs:  Lab Visit on 10/25/2022   Component Date Value Ref Range Status    Glucose 10/25/2022 95  82 - 115 mg/dL Final    BUN 10/25/2022 16.0  8 - 23 mg/dL Final    Creatinine 10/25/2022 0.77  0.50 - 0.90 mg/dL Final    Calcium 10/25/2022 10.2  8.6 - 10.2 mg/dL Final    Sodium 10/25/2022 143  136 - 145 mmol/L Final    Potassium 10/25/2022 4.5  3.5 - 5.1 mmol/L Final    Chloride 10/25/2022 103  98 - 107 mmol/L Final    CO2 10/25/2022 30 (H)  22 - 29 mmol/L Final    BUN/Creatinine Ratio 10/25/2022 20.8 (H)  6 - 20 Final    GFR ESTIMATION 10/25/2022 78.91  >60.00 Final    Anion Gap 10/25/2022 10.0  8.0 - 17.0 mmol/L Final    WBC 10/25/2022 7.31  4.3 - 10.8 X 10 3/ul Final    RBC 10/25/2022 5.13  4.2 - 5.4 X 10 6/ul Final    RDW-SD 10/25/2022 42.7  37 - 54 fl Final    Hemoglobin 10/25/2022 15.2  12 - 16 g/dL Final    Hematocrit 10/25/2022 45.9  37 - 47 % Final    MCV 10/25/2022 89.5  82 - 100 fl Final    MCH 10/25/2022 29.6  27 - 32 pg Final    [facility] [place] 10/25/2022  33.1  32 - 36 g/dL Final    Platelets 10/25/2022 328  135 - 400 X 10 3/ul Final    Neutrophils 10/25/2022 54.7  34 - 71.1 % Final    Lymphocytes 10/25/2022 31.2  19.3 - 53.1 % Final    Monocytes 10/25/2022 11.2  4.7 - 12.5 % Final    Eosinophils 10/25/2022 2.2  0.7 - 7.0 % Final    Basophils 10/25/2022 0.4  0.2 - 1.2 % Final    Neutrophils Absolute 10/25/2022 4.00  2.15 - 7.56 X 10 3/ul Final    Lymphocytes Absolute 10/25/2022 2.28  0.86 - 4.75 X 10 3/ul Final    Monocytes Absolute 10/25/2022 0.82  0.22 - 1.08 X 10 3/ul Final    Eosinophils Absolute 10/25/2022 0.16  0.04 - 0.54 X 10 3/ul Final    Basophils Absolute 10/25/2022 0.03  0.00 - 0.22 X 10 3/ul Final    Immature Granulocytes Absolute 10/25/2022 0.02  0 - 0.04 X 10 3/ul Final    Immature Granulocytes 10/25/2022 0.3  0 - 0.5 % Final    nRBC# 10/25/2022 0.0  0 - 0.2 /100 WBC Final    nRBC Count Absolute 10/25/2022 0.000  0 - 0.012 x 10 3/ul Final    Hemoglobin A1C 10/25/2022 5.4  4.0 - 6.0 % Final    EST AVERAGE GLUCOSE 10/25/2022 107  NORMAL MG/DL Final    AST 10/25/2022 19  0 - 32 U/L Final    ALT (SGPT) 10/25/2022 28  0 - 33 U/L Final    Alkaline Phosphatase 10/25/2022 77  35 - 105 U/L Final    Protein, Total 10/25/2022 6.8  6.4 - 8.3 g/dL Final    Albumin 10/25/2022 4.9  3.5 - 5.2 g/dL Final    BILIRUBIN, TOTAL 10/25/2022 0.42  0.00 - 1.20 mg/dL Final    Bilirubin, Direct 10/25/2022 <0.20  0.00 - 0.30 mg/dL Final    Globulin 10/25/2022 1.9  1.5 - 4.5 g/dL Final    Bilirubin, Indirect 10/25/2022 Unable to Calculate  0.10 - 0.80 mg/dL Final    TSH 10/25/2022 2.24  0.27 - 4.20 uIU/mL Final    VITAMIN D (25OHD) 10/25/2022 23.0 (L)  >30 ng/mL Final    Cholesterol 10/25/2022 170  100 - 200 mg/dL Final    Triglycerides 10/25/2022 107  0 - 150 mg/dL Final    HDL 10/25/2022 58 (L)  >60 mg/dL Final    LDL Cholesterol 10/25/2022 90.6  0 - 100 mg/dL Final    LDL/HDL Ratio 10/25/2022 1.6  1 - 3 Final        Assessment/Plan:       Problem List Items Addressed This Visit           Cardiac/Vascular    Essential hypertension    Relevant Orders    Basic Metabolic Panel    Hyperlipidemia    Relevant Orders    Lipid Panel       Endocrine    Hypothyroidism    Relevant Orders    TSH    Vitamin D deficiency    Relevant Orders    Vitamin D       Orthopedic    Osteoarthritis     Other Visit Diagnoses       Health maintenance examination    -  Primary    Breast cancer screening by mammogram        Relevant Orders    Mammo Digital Screening Bilat    Fatigue, unspecified type        Relevant Orders    CBC Auto Differential    Hemoglobin A1C    Hepatic Function Panel    Elevated fasting glucose        Relevant Orders    Hemoglobin A1C                 Follow up in about 6 months (around 4/30/2023).     Toby Curran

## 2022-11-14 RX ORDER — OMEPRAZOLE 20 MG/1
CAPSULE, DELAYED RELEASE ORAL
Qty: 90 CAPSULE | Refills: 1 | OUTPATIENT
Start: 2022-11-14

## 2023-01-09 ENCOUNTER — OFFICE VISIT (OUTPATIENT)
Dept: FAMILY MEDICINE | Facility: CLINIC | Age: 78
End: 2023-01-09
Payer: MEDICARE

## 2023-01-09 VITALS
HEIGHT: 68 IN | TEMPERATURE: 98 F | RESPIRATION RATE: 14 BRPM | HEART RATE: 59 BPM | WEIGHT: 182 LBS | DIASTOLIC BLOOD PRESSURE: 79 MMHG | SYSTOLIC BLOOD PRESSURE: 138 MMHG | OXYGEN SATURATION: 98 % | BODY MASS INDEX: 27.58 KG/M2

## 2023-01-09 DIAGNOSIS — F41.9 ANXIETY: ICD-10-CM

## 2023-01-09 DIAGNOSIS — H61.23 BILATERAL IMPACTED CERUMEN: ICD-10-CM

## 2023-01-09 DIAGNOSIS — H93.13 TINNITUS OF BOTH EARS: Primary | ICD-10-CM

## 2023-01-09 PROCEDURE — 99214 PR OFFICE/OUTPT VISIT, EST, LEVL IV, 30-39 MIN: ICD-10-PCS | Mod: S$GLB,,, | Performed by: OBSTETRICS & GYNECOLOGY

## 2023-01-09 PROCEDURE — 99214 OFFICE O/P EST MOD 30 MIN: CPT | Mod: S$GLB,,, | Performed by: OBSTETRICS & GYNECOLOGY

## 2023-01-09 RX ORDER — HYDROXYZINE HYDROCHLORIDE 50 MG/1
50 TABLET, FILM COATED ORAL 3 TIMES DAILY PRN
Qty: 30 TABLET | Refills: 3 | Status: SHIPPED | OUTPATIENT
Start: 2023-01-09 | End: 2023-01-19

## 2023-01-09 RX ORDER — LANOLIN ALCOHOL/MO/W.PET/CERES
1 CREAM (GRAM) TOPICAL 2 TIMES DAILY
COMMUNITY

## 2023-01-09 RX ORDER — ESCITALOPRAM OXALATE 10 MG/1
10 TABLET ORAL DAILY
Qty: 30 TABLET | Refills: 3 | Status: SHIPPED | OUTPATIENT
Start: 2023-01-09 | End: 2023-04-26

## 2023-01-09 NOTE — PROGRESS NOTES
Subjective:   Patient ID: Grace Arrieta is a 77 y.o. female who presents for evaluation today.    Chief Complaint:  Follow-up (Hospital. Patient states that the last 5-7 days when she lays down she could here her heart beat in her ear and she was scared. She also states that her blood pressure was high as well. )      History of Present Illness  HPI  Patient presents today for hospital follow up. She states she went to the hospital yesterday because she was hearing her heart beat in her ears and it made her nervous. She states her blood pressure was also elevated at this time. She had a chest x-ray, EKG and blood work performed. All were within normal limits per patient report. She does admit to anxiety.     Anxiety/Depression  Patient reports symptoms of anxiety.   Onset of symptoms was approximately several months ago.    Symptoms have been gradually worsening since that time.   She reports the following symptoms: palpitations racing thoughts.   Denies recurrent thoughts of death, suicidal attempt, suicidal thoughts, suicidal thoughts with specific plan, and suicidal thoughts without plan.   Previous treatment includes medication  clonazepam .     FAMILY HISTORY  Family History   Problem Relation Age of Onset    Cancer Mother      SOCIAL HISTORY  Social History     Tobacco Use   Smoking Status Former   Smokeless Tobacco Never   ,   Social History     Substance and Sexual Activity   Alcohol Use Yes     MEDICATIONS  Outpatient Medications Marked as Taking for the 1/9/23 encounter (Office Visit) with Elenita Corrales NP   Medication Sig Dispense Refill    calcium citrate-vitamin D3 315-200 mg (CITRACAL+D) 315 mg-5 mcg (200 unit) per tablet Take 1 tablet by mouth 2 (two) times daily.      levothyroxine (SYNTHROID) 125 MCG tablet Take 125 mcg by mouth before breakfast.       lisinopriL (PRINIVIL,ZESTRIL) 20 MG tablet TAKE 1 TABLET BY MOUTH EVERY DAY 90 tablet 3    omeprazole (PRILOSEC) 20 MG capsule TAKE 1 CAPSULE  "BY MOUTH EVERY DAY 90 capsule 2     Review of Systems   Review of Systems   Constitutional:  Negative for activity change, appetite change, fever and unexpected weight change.   HENT:  Negative for dental problem, hearing loss, sneezing, sore throat, trouble swallowing and voice change.    Eyes:  Negative for visual disturbance.   Respiratory:  Negative for choking, chest tightness, shortness of breath and stridor.    Cardiovascular:  Negative for chest pain and palpitations.   Gastrointestinal:  Negative for abdominal pain, anal bleeding, blood in stool, change in bowel habit, nausea, vomiting, fecal incontinence and change in bowel habit.   Endocrine: Negative for polydipsia, polyphagia and polyuria.   Genitourinary:  Negative for decreased urine volume, difficulty urinating, dysuria, frequency, hematuria and urgency.   Musculoskeletal:  Negative for gait problem, joint swelling, neck pain, neck stiffness and joint deformity.   Integumentary:  Negative for color change, pallor, rash, wound and mole/lesion.   Allergic/Immunologic: Negative for immunocompromised state.   Neurological:  Negative for vertigo, seizures, syncope, weakness, light-headedness, coordination difficulties and coordination difficulties.   Hematological:  Negative for adenopathy. Does not bruise/bleed easily.   Psychiatric/Behavioral:  Negative for agitation, behavioral problems, confusion, hallucinations, sleep disturbance and suicidal ideas. The patient is nervous/anxious.        Objective:    VITALS  BP Readings from Last 1 Encounters:   01/09/23 138/79   Weight: 82.6 kg (182 lb)   Height: 5' 8" (172.7 cm)   BMI Readings from Last 1 Encounters:   01/09/23 27.67 kg/m²       Physical Exam  Vitals reviewed.   Constitutional:       General: She is not in acute distress.     Appearance: Normal appearance. She is not ill-appearing, toxic-appearing or diaphoretic.   HENT:      Head: Normocephalic and atraumatic.      Right Ear: External ear normal. " There is impacted cerumen. Tympanic membrane is not erythematous.      Left Ear: External ear normal. There is impacted cerumen. Tympanic membrane is not erythematous.      Nose: Nose normal. No congestion or rhinorrhea.   Eyes:      General:         Right eye: No discharge.         Left eye: No discharge.   Neck:      Thyroid: No thyroid mass, thyromegaly or thyroid tenderness.   Cardiovascular:      Rate and Rhythm: Normal rate and regular rhythm.      Heart sounds: Normal heart sounds. No murmur heard.    No friction rub. No gallop.   Pulmonary:      Effort: Pulmonary effort is normal. No respiratory distress.      Breath sounds: Normal breath sounds. No stridor. No wheezing, rhonchi or rales.   Musculoskeletal:         General: Normal range of motion.      Cervical back: Normal range of motion and neck supple.      Right lower leg: No edema.      Left lower leg: No edema.   Skin:     General: Skin is warm and dry.      Coloration: Skin is not jaundiced or pale.      Findings: No rash.   Neurological:      General: No focal deficit present.      Mental Status: She is alert and oriented to person, place, and time.      Gait: Gait normal.   Psychiatric:         Mood and Affect: Mood normal.         Behavior: Behavior normal.         Thought Content: Thought content normal.         Judgment: Judgment normal.       Assessment:      1. Tinnitus of both ears    2. Anxiety    3. Bilateral impacted cerumen       Plan:   Tinnitus of both ears  -     CTA Head and Neck (xpd); Future; Expected date: 01/09/2023  Per Dr. Curran, will plan to rule out blockage. Follow up when results are received.     Anxiety  -     EScitalopram oxalate (LEXAPRO) 10 MG tablet; Take 1 tablet (10 mg total) by mouth once daily.  Dispense: 30 tablet; Refill: 3  -     hydrOXYzine (ATARAX) 50 MG tablet; Take 1 tablet (50 mg total) by mouth 3 (three) times daily as needed for Anxiety.  Dispense: 30 tablet; Refill: 3  Will plan for Lexapro initiation.  Discussed use of hydroxyzine PRN. RTC 6 weeks for reassessment of symptoms. Educated regarding non-pharmacological methods for dealing with symptoms of anxiety such as, relaxation techniques, deep breathing exercises, exercise, etc. Discussion of pharmacological management was carried out at length. Medication regimen, side effects, mechanism of action, etc. discussed.  Please note the following aspects associated with the treatment regimen:  When first starting medication or making dose adjustments, it may take 2-4 weeks to begin feeling the effects of medication  Avoid abrupt withdrawal from treatment. If you wish to discontinue medication therapy, please contact the office before doing so to discuss how to safely get off the medication.  Instructed to report immediately to the nearest ER with any thoughts of suicidal or homicidal ideation. Please notify provider immediately.  Resources are available for dealing with suicidal thoughts: [phone] ([phone]) or [email or web address]   Contact provider immediately if symptoms worsen or new symptoms appear.  Report to the nearest emergency room if symptoms of serotonin syndrome are experienced such as sudden onset of high fever, muscular rigidity, mental status changes, hyperreflexia/clonus, and uncontrolled shivering     Bilateral impacted cerumen  The patient had cerumen removed form the left and right ear canal in order to visualize the tympanic membrane. This was accomplished with ear lavage & cerumen loop with no complications. The tympanic membrane was subsequently visualized.    Patient instructed to contact the clinic should any questions or concerns arise prior to next office visit. Risks, benefits, and alternatives discussed with patient. Patient verbalized understanding of discussed plan of care. Asked patient if any further questions, answered no. Follow up as discussed or sooner PRN.

## 2023-01-10 ENCOUNTER — TELEPHONE (OUTPATIENT)
Dept: FAMILY MEDICINE | Facility: CLINIC | Age: 78
End: 2023-01-10
Payer: MEDICARE

## 2023-01-10 DIAGNOSIS — R10.13 EPIGASTRIC ABDOMINAL PAIN: ICD-10-CM

## 2023-01-10 DIAGNOSIS — R07.9 CHEST PAIN, UNSPECIFIED TYPE: Primary | ICD-10-CM

## 2023-01-10 DIAGNOSIS — R10.9 ABDOMINAL PAIN, UNSPECIFIED ABDOMINAL LOCATION: ICD-10-CM

## 2023-01-10 NOTE — TELEPHONE ENCOUNTER
I spoke to patient in detail has been to [facility] ER 3 times in the last few days for severe acid reflux.  She said they gave her Carafate and pepcid but it really did not help.  She gets severe burning that goes thru to her back that is so bad it makes her shake.  I suggest if the pain does not get better to go to [place] to get checked.  She said she had lab work that was ok.

## 2023-01-12 ENCOUNTER — TELEPHONE (OUTPATIENT)
Dept: FAMILY MEDICINE | Facility: CLINIC | Age: 78
End: 2023-01-12
Payer: MEDICARE

## 2023-01-12 DIAGNOSIS — E55.9 VITAMIN D DEFICIENCY: ICD-10-CM

## 2023-01-12 DIAGNOSIS — E03.9 HYPOTHYROIDISM, UNSPECIFIED TYPE: ICD-10-CM

## 2023-01-12 DIAGNOSIS — Z79.899 ENCOUNTER FOR LONG-TERM (CURRENT) USE OF OTHER MEDICATIONS: ICD-10-CM

## 2023-01-12 DIAGNOSIS — E78.5 HYPERLIPIDEMIA, UNSPECIFIED HYPERLIPIDEMIA TYPE: ICD-10-CM

## 2023-01-12 DIAGNOSIS — I10 ESSENTIAL HYPERTENSION: ICD-10-CM

## 2023-01-12 DIAGNOSIS — R10.13 EPIGASTRIC PAIN: Primary | ICD-10-CM

## 2023-01-12 NOTE — TELEPHONE ENCOUNTER
I spoke to patient and let her know that her CT scans were ok per Dr Curran.  Dr Curran would like her to come in Friday for lab work.  She will come in for labs on Friday.

## 2023-01-13 ENCOUNTER — TELEPHONE (OUTPATIENT)
Dept: FAMILY MEDICINE | Facility: CLINIC | Age: 78
End: 2023-01-13

## 2023-01-13 NOTE — TELEPHONE ENCOUNTER
Dr Curran went over her labs and they were good.  Lab results were given to patient.  Dr Curran sent patient to [facility] to be evaluated and scoped for severe gastric pain and burning.

## 2023-01-14 ENCOUNTER — OUTSIDE PLACE OF SERVICE (OUTPATIENT)
Dept: GASTROENTEROLOGY | Facility: CLINIC | Age: 78
End: 2023-01-14
Payer: MEDICARE

## 2023-01-14 PROCEDURE — 99223 1ST HOSP IP/OBS HIGH 75: CPT | Mod: ,,, | Performed by: INTERNAL MEDICINE

## 2023-01-14 PROCEDURE — 99223 PR INITIAL HOSPITAL CARE,LEVL III: ICD-10-PCS | Mod: ,,, | Performed by: INTERNAL MEDICINE

## 2023-01-15 ENCOUNTER — OUTSIDE PLACE OF SERVICE (OUTPATIENT)
Dept: GASTROENTEROLOGY | Facility: CLINIC | Age: 78
End: 2023-01-15
Payer: MEDICARE

## 2023-01-15 PROCEDURE — 43239 PR EGD, FLEX, W/BIOPSY, SGL/MULTI: ICD-10-PCS | Mod: ,,, | Performed by: INTERNAL MEDICINE

## 2023-01-15 PROCEDURE — 43239 EGD BIOPSY SINGLE/MULTIPLE: CPT | Mod: ,,, | Performed by: INTERNAL MEDICINE

## 2023-01-19 ENCOUNTER — OFFICE VISIT (OUTPATIENT)
Dept: FAMILY MEDICINE | Facility: CLINIC | Age: 78
End: 2023-01-19
Payer: MEDICARE

## 2023-01-19 VITALS
WEIGHT: 178 LBS | BODY MASS INDEX: 26.98 KG/M2 | DIASTOLIC BLOOD PRESSURE: 72 MMHG | HEART RATE: 68 BPM | TEMPERATURE: 98 F | HEIGHT: 68 IN | SYSTOLIC BLOOD PRESSURE: 135 MMHG | OXYGEN SATURATION: 98 %

## 2023-01-19 DIAGNOSIS — F41.1 ANXIETY STATE: Primary | ICD-10-CM

## 2023-01-19 DIAGNOSIS — M19.90 OSTEOARTHRITIS, UNSPECIFIED OSTEOARTHRITIS TYPE, UNSPECIFIED SITE: ICD-10-CM

## 2023-01-19 DIAGNOSIS — E55.9 VITAMIN D DEFICIENCY: ICD-10-CM

## 2023-01-19 DIAGNOSIS — E78.5 HYPERLIPIDEMIA, UNSPECIFIED HYPERLIPIDEMIA TYPE: ICD-10-CM

## 2023-01-19 DIAGNOSIS — G47.00 INSOMNIA, UNSPECIFIED TYPE: ICD-10-CM

## 2023-01-19 DIAGNOSIS — K21.9 GERD WITHOUT ESOPHAGITIS: ICD-10-CM

## 2023-01-19 DIAGNOSIS — I10 ESSENTIAL HYPERTENSION: ICD-10-CM

## 2023-01-19 DIAGNOSIS — E03.9 HYPOTHYROIDISM, UNSPECIFIED TYPE: ICD-10-CM

## 2023-01-19 PROCEDURE — 99214 OFFICE O/P EST MOD 30 MIN: CPT | Mod: S$GLB,,, | Performed by: INTERNAL MEDICINE

## 2023-01-19 PROCEDURE — 99214 PR OFFICE/OUTPT VISIT, EST, LEVL IV, 30-39 MIN: ICD-10-PCS | Mod: S$GLB,,, | Performed by: INTERNAL MEDICINE

## 2023-01-19 RX ORDER — PANTOPRAZOLE SODIUM 20 MG/1
20 TABLET, DELAYED RELEASE ORAL DAILY
Qty: 30 TABLET | Refills: 6 | Status: SHIPPED | OUTPATIENT
Start: 2023-01-19 | End: 2023-09-13 | Stop reason: SDUPTHER

## 2023-01-19 RX ORDER — CLONAZEPAM 0.5 MG/1
0.5 TABLET ORAL NIGHTLY PRN
Qty: 60 TABLET | Refills: 5 | Status: SHIPPED | OUTPATIENT
Start: 2023-01-19 | End: 2023-01-20 | Stop reason: SDUPTHER

## 2023-01-19 RX ORDER — CLONAZEPAM 0.5 MG/1
0.5 TABLET ORAL NIGHTLY PRN
COMMUNITY
Start: 2023-01-16 | End: 2023-01-19 | Stop reason: SDUPTHER

## 2023-01-19 NOTE — PROGRESS NOTES
Subjective:       Patient ID: Grace Arrieta is a 77 y.o. female.    Chief Complaint: Follow-up      HPI: Grace comes in today for follow up, accompanied by two brothers.  Ms. Arrieta has had problems with pain in her mid epigastric abdominal area and burning in her extremities.  This was combined with worsening anxiety and she was taken to the emergency room at least 3 different times.  Testing of made to conclusion that it was dyspepsia and she was started on Carafate and Pepcid was added to her PPI.  She got no relief and she went back to the emergency room was ultimately admitted.  She did have an EGD and a colonoscopy during that hospitalization the biopsies did show some gastritis and duodenitis.  Around the time that all of these symptoms started she had been put on Lexapro for anxiety.  At this point she is feeling some better and she is not having nearly as many symptoms.  She was put on a little clonazepam at bedtime when she left the hospital.  I have reviewed blood work and testing that has been done as an outpatient and in the hospital.  I ordered a CT of her abdomen to rule out a dissection and also ordered a CTA of her abdomen.  All of that was essentially normal.  After long discussion we are going to allow her to stay on the Lexapro 10 mg for few more days as long as she continues to see improvement in how she feels.  The clonazepam she is going to take a 0.5 mg in the morning a 0.5 mg in the afternoon and 1 mg at bedtime to help her rest.  She is going to give us of of herbal update next week on how she is feeling.  I did also review lab work from the ER and also blood work that I have done recently.  She agrees with this approach.       Past Medical History:   Past Medical History:   Diagnosis Date    Hypertension     Thyroid disease        Past Surgical Historical:   Past Surgical History:   Procedure Laterality Date    CHOLECYSTECTOMY      HYSTERECTOMY      TONSILLECTOMY          Vitals: BP  "135/72 (BP Location: Right arm, Patient Position: Sitting, BP Method: Medium (Automatic))   Pulse 68   Temp 98 °F (36.7 °C) (Temporal)   Ht 5' 8" (1.727 m)   Wt 80.7 kg (178 lb)   SpO2 98%   BMI 27.06 kg/m²      Medications:   Medication List with Changes/Refills   New Medications    PANTOPRAZOLE (PROTONIX) 20 MG TABLET    Take 1 tablet (20 mg total) by mouth once daily.   Current Medications    CALCIUM CITRATE-VITAMIN D3 315-200 MG (CITRACAL+D) 315 MG-5 MCG (200 UNIT) PER TABLET    Take 1 tablet by mouth 2 (two) times daily.    ESCITALOPRAM OXALATE (LEXAPRO) 10 MG TABLET    Take 1 tablet (10 mg total) by mouth once daily.    LEVOTHYROXINE (SYNTHROID) 125 MCG TABLET    Take 125 mcg by mouth before breakfast.     LISINOPRIL (PRINIVIL,ZESTRIL) 20 MG TABLET    TAKE 1 TABLET BY MOUTH EVERY DAY   Changed and/or Refilled Medications    Modified Medication Previous Medication    CLONAZEPAM (KLONOPIN) 0.5 MG TABLET clonazePAM (KLONOPIN) 0.5 MG tablet       Take 1 tablet (0.5 mg total) by mouth nightly as needed for Anxiety.    Take 0.5 mg by mouth nightly as needed.   Discontinued Medications    HYDROXYZINE (ATARAX) 50 MG TABLET    Take 1 tablet (50 mg total) by mouth 3 (three) times daily as needed for Anxiety.    OMEPRAZOLE (PRILOSEC) 20 MG CAPSULE    TAKE 1 CAPSULE BY MOUTH EVERY DAY        Past Social History:   Social History     Socioeconomic History    Marital status:    Tobacco Use    Smoking status: Former    Smokeless tobacco: Never   Substance and Sexual Activity    Alcohol use: Yes    Drug use: Never    Sexual activity: Not Currently       Allergies: Review of patient's allergies indicates:  No Known Allergies     Family History:   Family History   Problem Relation Age of Onset    Cancer Mother         Review of Systems:  Review of Systems   Respiratory:  Negative for shortness of breath and wheezing.    Cardiovascular:  Negative for chest pain and palpitations.   Gastrointestinal:  Negative for " abdominal pain, change in bowel habit and change in bowel habit.   Musculoskeletal:  Negative for gait problem.   Neurological:  Negative for dizziness and syncope.   Psychiatric/Behavioral:  Negative for suicidal ideas.       Physical Exam:  Physical Exam  Constitutional:       Appearance: Normal appearance.   Cardiovascular:      Rate and Rhythm: Normal rate and regular rhythm.   Pulmonary:      Effort: Pulmonary effort is normal.      Breath sounds: Normal breath sounds.   Abdominal:      General: Abdomen is flat.      Palpations: Abdomen is soft.   Skin:     General: Skin is warm and dry.   Neurological:      General: No focal deficit present.      Mental Status: She is alert and oriented to person, place, and time.   Psychiatric:         Mood and Affect: Mood normal.        Labs:  Lab Visit on 01/13/2023   Component Date Value Ref Range Status    Amylase 01/13/2023 47  28 - 100 U/L Final    Lipase 01/13/2023 35  13 - 60 U/L Final    Glucose 01/13/2023 112  82 - 115 mg/dL Final    BUN 01/13/2023 19.2  8 - 23 mg/dL Final    Creatinine 01/13/2023 0.79  0.50 - 0.90 mg/dL Final    Calcium 01/13/2023 9.7  8.6 - 10.2 mg/dL Final    Sodium 01/13/2023 141  136 - 145 mmol/L Final    Potassium 01/13/2023 4.0  3.5 - 5.1 mmol/L Final    Chloride 01/13/2023 103  98 - 107 mmol/L Final    CO2 01/13/2023 25  22 - 29 mmol/L Final    BUN/Creatinine Ratio 01/13/2023 24.3 (H)  6 - 20 Final    GFR ESTIMATION 01/13/2023 76.52  >60.00 Final    Anion Gap 01/13/2023 13.0  8.0 - 17.0 mmol/L Final    WBC 01/13/2023 10.07  4.3 - 10.8 X 10 3/ul Final    RBC 01/13/2023 5.23  4.2 - 5.4 X 10 6/ul Final    RDW-SD 01/13/2023 38.9  37 - 54 fl Final    Hemoglobin 01/13/2023 15.7  12 - 16 g/dL Final    Hematocrit 01/13/2023 44.9  37 - 47 % Final    MCV 01/13/2023 85.9  82 - 100 fl Final    MCH 01/13/2023 30.0  27 - 32 pg Final    MCHC 01/13/2023 35.0  32 - 36 g/dL Final    Platelets 01/13/2023 421 (H)  135 - 400 X 10 3/ul Final    Neutrophils  01/13/2023 69.8  34 - 71.1 % Final    Lymphocytes 01/13/2023 19.6  19.3 - 53.1 % Final    Monocytes 01/13/2023 9.7  4.7 - 12.5 % Final    Eosinophils 01/13/2023 0.3 (L)  0.7 - 7.0 % Final    Basophils 01/13/2023 0.4  0.2 - 1.2 % Final    Neutrophils Absolute 01/13/2023 7.03  2.15 - 7.56 X 10 3/ul Final    Lymphocytes Absolute 01/13/2023 1.97  0.86 - 4.75 X 10 3/ul Final    Monocytes Absolute 01/13/2023 0.98  0.22 - 1.08 X 10 3/ul Final    Eosinophils Absolute 01/13/2023 0.03 (L)  0.04 - 0.54 X 10 3/ul Final    Basophils Absolute 01/13/2023 0.04  0.00 - 0.22 X 10 3/ul Final    Immature Granulocytes Absolute 01/13/2023 0.02  0 - 0.04 X 10 3/ul Final    Immature Granulocytes 01/13/2023 0.2  0 - 0.5 % Final    nRBC# 01/13/2023 0.0  0 - 0.2 /100 WBC Final    nRBC Count Absolute 01/13/2023 0.000  0 - 0.012 x 10 3/ul Final    Hemoglobin A1C 01/13/2023 5.4  4.0 - 6.0 % Final    EST AVERAGE GLUCOSE 01/13/2023 107  NORMAL MG/DL Final    AST 01/13/2023 22  0 - 32 U/L Final    ALT (SGPT) 01/13/2023 29  0 - 33 U/L Final    Alkaline Phosphatase 01/13/2023 75  35 - 105 U/L Final    Protein, Total 01/13/2023 6.9  6.4 - 8.3 g/dL Final    Albumin 01/13/2023 4.7  3.5 - 5.2 g/dL Final    BILIRUBIN, TOTAL 01/13/2023 0.64  0.00 - 1.20 mg/dL Final    Bilirubin, Direct 01/13/2023 <0.20  0.00 - 0.30 mg/dL Final    Globulin 01/13/2023 2.2  1.5 - 4.5 g/dL Final    Bilirubin, Indirect 01/13/2023 Unable to Calculate  0.10 - 0.80 mg/dL Final    TSH 01/13/2023 2.37  0.27 - 4.20 uIU/mL Final    Cholesterol 01/13/2023 157  100 - 200 mg/dL Final    Triglycerides 01/13/2023 92  0 - 150 mg/dL Final    HDL 01/13/2023 47 (L)  >60 mg/dL Final    LDL Cholesterol 01/13/2023 91.6  0 - 100 mg/dL Final    LDL/HDL Ratio 01/13/2023 1.9  1 - 3 Final    VITAMIN D (25OHD) 01/13/2023 36.8  >30 ng/mL Final   Lab Visit on 10/25/2022   Component Date Value Ref Range Status    Glucose 10/25/2022 95  82 - 115 mg/dL Final    BUN 10/25/2022 16.0  8 - 23 mg/dL Final     Creatinine 10/25/2022 0.77  0.50 - 0.90 mg/dL Final    Calcium 10/25/2022 10.2  8.6 - 10.2 mg/dL Final    Sodium 10/25/2022 143  136 - 145 mmol/L Final    Potassium 10/25/2022 4.5  3.5 - 5.1 mmol/L Final    Chloride 10/25/2022 103  98 - 107 mmol/L Final    CO2 10/25/2022 30 (H)  22 - 29 mmol/L Final    BUN/Creatinine Ratio 10/25/2022 20.8 (H)  6 - 20 Final    GFR ESTIMATION 10/25/2022 78.91  >60.00 Final    Anion Gap 10/25/2022 10.0  8.0 - 17.0 mmol/L Final    WBC 10/25/2022 7.31  4.3 - 10.8 X 10 3/ul Final    RBC 10/25/2022 5.13  4.2 - 5.4 X 10 6/ul Final    RDW-SD 10/25/2022 42.7  37 - 54 fl Final    Hemoglobin 10/25/2022 15.2  12 - 16 g/dL Final    Hematocrit 10/25/2022 45.9  37 - 47 % Final    MCV 10/25/2022 89.5  82 - 100 fl Final    MCH 10/25/2022 29.6  27 - 32 pg Final    MCHC 10/25/2022 33.1  32 - 36 g/dL Final    Platelets 10/25/2022 328  135 - 400 X 10 3/ul Final    Neutrophils 10/25/2022 54.7  34 - 71.1 % Final    Lymphocytes 10/25/2022 31.2  19.3 - 53.1 % Final    Monocytes 10/25/2022 11.2  4.7 - 12.5 % Final    Eosinophils 10/25/2022 2.2  0.7 - 7.0 % Final    Basophils 10/25/2022 0.4  0.2 - 1.2 % Final    Neutrophils Absolute 10/25/2022 4.00  2.15 - 7.56 X 10 3/ul Final    Lymphocytes Absolute 10/25/2022 2.28  0.86 - 4.75 X 10 3/ul Final    Monocytes Absolute 10/25/2022 0.82  0.22 - 1.08 X 10 3/ul Final    Eosinophils Absolute 10/25/2022 0.16  0.04 - 0.54 X 10 3/ul Final    Basophils Absolute 10/25/2022 0.03  0.00 - 0.22 X 10 3/ul Final    Immature Granulocytes Absolute 10/25/2022 0.02  0 - 0.04 X 10 3/ul Final    Immature Granulocytes 10/25/2022 0.3  0 - 0.5 % Final    nRBC# 10/25/2022 0.0  0 - 0.2 /100 WBC Final    nRBC Count Absolute 10/25/2022 0.000  0 - 0.012 x 10 3/ul Final    Hemoglobin A1C 10/25/2022 5.4  4.0 - 6.0 % Final    EST AVERAGE GLUCOSE 10/25/2022 107  NORMAL MG/DL Final    AST 10/25/2022 19  0 - 32 U/L Final    ALT (SGPT) 10/25/2022 28  0 - 33 U/L Final    Alkaline Phosphatase  10/25/2022 77  35 - 105 U/L Final    Protein, Total 10/25/2022 6.8  6.4 - 8.3 g/dL Final    Albumin 10/25/2022 4.9  3.5 - 5.2 g/dL Final    BILIRUBIN, TOTAL 10/25/2022 0.42  0.00 - 1.20 mg/dL Final    Bilirubin, Direct 10/25/2022 <0.20  0.00 - 0.30 mg/dL Final    Globulin 10/25/2022 1.9  1.5 - 4.5 g/dL Final    Bilirubin, Indirect 10/25/2022 Unable to Calculate  0.10 - 0.80 mg/dL Final    TSH 10/25/2022 2.24  0.27 - 4.20 uIU/mL Final    VITAMIN D (25OHD) 10/25/2022 23.0 (L)  >30 ng/mL Final    Cholesterol 10/25/2022 170  100 - 200 mg/dL Final    Triglycerides 10/25/2022 107  0 - 150 mg/dL Final    HDL 10/25/2022 58 (L)  >60 mg/dL Final    LDL Cholesterol 10/25/2022 90.6  0 - 100 mg/dL Final    LDL/HDL Ratio 10/25/2022 1.6  1 - 3 Final        Assessment/Plan:       Problem List Items Addressed This Visit          Psychiatric    Anxiety state - Primary    Relevant Medications    clonazePAM (KLONOPIN) 0.5 MG tablet       Cardiac/Vascular    Essential hypertension    Hyperlipidemia       Endocrine    Hypothyroidism    Vitamin D deficiency       Orthopedic    Osteoarthritis       Other    Insomnia     Other Visit Diagnoses       GERD without esophagitis        Relevant Medications    pantoprazole (PROTONIX) 20 MG tablet                 Follow up in about 6 months (around 7/19/2023).     Toby Curran

## 2023-01-20 ENCOUNTER — TELEPHONE (OUTPATIENT)
Dept: FAMILY MEDICINE | Facility: CLINIC | Age: 78
End: 2023-01-20
Payer: MEDICARE

## 2023-01-20 DIAGNOSIS — F41.1 ANXIETY STATE: ICD-10-CM

## 2023-01-20 RX ORDER — CLONAZEPAM 0.5 MG/1
TABLET ORAL
Qty: 120 TABLET | Refills: 5 | Status: SHIPPED | OUTPATIENT
Start: 2023-01-20 | End: 2023-04-26

## 2023-01-20 NOTE — TELEPHONE ENCOUNTER
----- Message from Toby Curran MD sent at 1/17/2023  8:41 AM CST -----  Labs look okay.  How is she doing?

## 2023-01-27 ENCOUNTER — TELEPHONE (OUTPATIENT)
Dept: FAMILY MEDICINE | Facility: CLINIC | Age: 78
End: 2023-01-27
Payer: MEDICARE

## 2023-01-27 NOTE — TELEPHONE ENCOUNTER
----- Message from Elenita Corrales NP sent at 1/26/2023  8:18 AM CST -----  Please let her know the scans were normal.

## 2023-02-14 ENCOUNTER — DOCUMENT SCAN (OUTPATIENT)
Dept: HOME HEALTH SERVICES | Facility: HOSPITAL | Age: 78
End: 2023-02-14
Payer: MEDICARE

## 2023-03-07 ENCOUNTER — TELEPHONE (OUTPATIENT)
Dept: FAMILY MEDICINE | Facility: CLINIC | Age: 78
End: 2023-03-07
Payer: MEDICARE

## 2023-03-07 NOTE — TELEPHONE ENCOUNTER
----- Message from Dennise Scales sent at 3/7/2023  2:30 PM CST -----  Contact: pt  Pt calling to [facility] she was a pt of elissa  and she can be reached at [phone]    Thanks,

## 2023-03-14 ENCOUNTER — DOCUMENT SCAN (OUTPATIENT)
Dept: HOME HEALTH SERVICES | Facility: HOSPITAL | Age: 78
End: 2023-03-14
Payer: MEDICARE

## 2023-03-15 ENCOUNTER — DOCUMENT SCAN (OUTPATIENT)
Dept: HOME HEALTH SERVICES | Facility: HOSPITAL | Age: 78
End: 2023-03-15
Payer: MEDICARE

## 2023-03-23 RX ORDER — LISINOPRIL 20 MG/1
20 TABLET ORAL DAILY
Qty: 30 TABLET | Refills: 1 | Status: SHIPPED | OUTPATIENT
Start: 2023-03-23 | End: 2023-04-26

## 2023-03-23 NOTE — TELEPHONE ENCOUNTER
----- Message from Perla Meier sent at 3/23/2023  2:32 PM CDT -----  Contact: pt  Type:  Sooner Apoointment Request    Caller is requesting a sooner appointment.  Caller declined first available appointment listed below.  Caller will not accept being placed on the waitlist and is requesting a message be sent to doctor.  Name of Caller: Pt   When is the first available appointment? 4/2023  Symptoms: estab care/ med refill and states to [facility] today /  Would the patient rather a call back or a response via [facility]?  phone  Best Call Back Number:[phone]  Additional Information: out of her BP meds     Pls call today

## 2023-03-23 NOTE — TELEPHONE ENCOUNTER
Informed patient that her medication is at the pharmacy and she can pick it up. She verbalized understanding

## 2023-04-26 ENCOUNTER — OFFICE VISIT (OUTPATIENT)
Dept: FAMILY MEDICINE | Facility: CLINIC | Age: 78
End: 2023-04-26
Payer: MEDICARE

## 2023-04-26 VITALS
HEIGHT: 68 IN | BODY MASS INDEX: 25.82 KG/M2 | SYSTOLIC BLOOD PRESSURE: 132 MMHG | DIASTOLIC BLOOD PRESSURE: 82 MMHG | TEMPERATURE: 97 F | HEART RATE: 66 BPM | OXYGEN SATURATION: 95 % | WEIGHT: 170.38 LBS | RESPIRATION RATE: 15 BRPM

## 2023-04-26 DIAGNOSIS — K21.9 GERD WITHOUT ESOPHAGITIS: ICD-10-CM

## 2023-04-26 DIAGNOSIS — Z76.89 ENCOUNTER TO ESTABLISH CARE: Primary | ICD-10-CM

## 2023-04-26 DIAGNOSIS — E03.9 HYPOTHYROIDISM, UNSPECIFIED TYPE: ICD-10-CM

## 2023-04-26 DIAGNOSIS — I10 ESSENTIAL HYPERTENSION: ICD-10-CM

## 2023-04-26 DIAGNOSIS — F41.1 ANXIETY STATE: ICD-10-CM

## 2023-04-26 PROCEDURE — 99215 PR OFFICE/OUTPT VISIT, EST, LEVL V, 40-54 MIN: ICD-10-PCS | Mod: S$GLB,,, | Performed by: INTERNAL MEDICINE

## 2023-04-26 PROCEDURE — 99215 OFFICE O/P EST HI 40 MIN: CPT | Mod: S$GLB,,, | Performed by: INTERNAL MEDICINE

## 2023-04-26 RX ORDER — ACETAMINOPHEN 500 MG
TABLET ORAL DAILY
COMMUNITY

## 2023-04-26 RX ORDER — LISINOPRIL 20 MG/1
20 TABLET ORAL DAILY
Qty: 90 TABLET | Refills: 1 | Status: SHIPPED | OUTPATIENT
Start: 2023-04-26 | End: 2023-11-02 | Stop reason: SDUPTHER

## 2023-04-26 RX ORDER — CLONAZEPAM 0.5 MG/1
0.5 TABLET ORAL 3 TIMES DAILY PRN
Qty: 90 TABLET | Refills: 5 | Status: SHIPPED | OUTPATIENT
Start: 2023-04-26 | End: 2023-09-12 | Stop reason: SDUPTHER

## 2023-04-26 NOTE — PROGRESS NOTES
Subjective:       Patient ID: Grace Arrieta is a 78 y.o. female.    Chief Complaint: Establish Care (Pt. Of Dr. Carmichael to est. Care.  No c/o today.  Needs update on RF of Lisinopril sent for 90 day supply.  Pt. Takes Klonopin for anxiety because she's allergic to Lexapro and will need new rf with 90 day supply, insurance wont pay for more than 90 day supply, will need PA if you want her to take 4 tabs a day like Dr. Carmichael had her on.)    HPI    78 y.o. female here to establish care.    Patient Care Team:  Yusra Philip MD as PCP - General (Internal Medicine)  Darci Mckeon MD (Endocrinology)    Hypothyroidism: 1/2 tab of levothyroxine 125mcg q AM  HTN: lisinopril 20mg. BP 120s/60s at home, checks daily  Anxiety: doing well on clonazepam 0.5mg TID rather than bid plus 1 mg qhs. Feeling much better off of lexapro now.           Health Maintenance Topics with due status: Not Due       Topic Last Completion Date    Hemoglobin A1c (Prediabetes) 01/13/2023    Lipid Panel 01/13/2023       Health Maintenance Due   Topic Date Due    Hepatitis C Screening  Never done    TETANUS VACCINE  Never done    DEXA Scan  05/24/2015    COVID-19 Vaccine (3 - Booster for Moderna series) 06/17/2021         Medical Problems:      Patient Active Problem List   Diagnosis    Anxiety state    Carotid artery occlusion    Diverticular disease of colon    Glaucoma    Hypothyroidism    Insomnia    Osteoarthritis    Essential hypertension    Vitamin D deficiency    Hyperlipidemia    GERD without esophagitis       Current Outpatient Medications on File Prior to Visit   Medication Sig Dispense Refill    calcium citrate-vitamin D3 315-200 mg (CITRACAL+D) 315 mg-5 mcg (200 unit) per tablet Take 1 tablet by mouth 2 (two) times daily.      cholecalciferol, vitamin D3, (VITAMIN D3) 50 mcg (2,000 unit) Cap capsule Take by mouth once daily.      ibuprofen/diphenhydramine cit (ADVIL PM ORAL) Take by mouth every evening.      levothyroxine  (SYNTHROID) 125 MCG tablet Take 125 mcg by mouth before breakfast.       pantoprazole (PROTONIX) 20 MG tablet Take 1 tablet (20 mg total) by mouth once daily. 30 tablet 6    [DISCONTINUED] clonazePAM (KLONOPIN) 0.5 MG tablet Take 1 in the morning, 1 early afternoon, and 2 at bedtime 120 tablet 5    [DISCONTINUED] lisinopriL (PRINIVIL,ZESTRIL) 20 MG tablet Take 1 tablet (20 mg total) by mouth once daily. 30 tablet 1    [DISCONTINUED] EScitalopram oxalate (LEXAPRO) 10 MG tablet Take 1 tablet (10 mg total) by mouth once daily. 30 tablet 3     No current facility-administered medications on file prior to visit.           Past Medical History:   Diagnosis Date    Hypertension     Thyroid disease      Past Surgical History:   Procedure Laterality Date    CHOLECYSTECTOMY      HYSTERECTOMY      TONSILLECTOMY       Family History   Problem Relation Age of Onset    Cancer Mother      Social History     Socioeconomic History    Marital status:    Tobacco Use    Smoking status: Former     Types: Cigarettes     Passive exposure: Past    Smokeless tobacco: Never   Substance and Sexual Activity    Alcohol use: Not Currently    Drug use: Never    Sexual activity: Not Currently     Partners: Male     Review of patient's allergies indicates:   Allergen Reactions    Lexapro [escitalopram] Other (See Comments)       Current Outpatient Medications:     calcium citrate-vitamin D3 315-200 mg (CITRACAL+D) 315 mg-5 mcg (200 unit) per tablet, Take 1 tablet by mouth 2 (two) times daily., Disp: , Rfl:     cholecalciferol, vitamin D3, (VITAMIN D3) 50 mcg (2,000 unit) Cap capsule, Take by mouth once daily., Disp: , Rfl:     ibuprofen/diphenhydramine cit (ADVIL PM ORAL), Take by mouth every evening., Disp: , Rfl:     levothyroxine (SYNTHROID) 125 MCG tablet, Take 125 mcg by mouth before breakfast. , Disp: , Rfl:     pantoprazole (PROTONIX) 20 MG tablet, Take 1 tablet (20 mg total) by mouth once daily., Disp: 30 tablet, Rfl: 6    clonazePAM  "(KLONOPIN) 0.5 MG tablet, Take 1 tablet (0.5 mg total) by mouth 3 (three) times daily as needed for Anxiety., Disp: 90 tablet, Rfl: 5    lisinopriL (PRINIVIL,ZESTRIL) 20 MG tablet, Take 1 tablet (20 mg total) by mouth once daily., Disp: 90 tablet, Rfl: 1        Review of Systems   Constitutional:  Negative for diaphoresis and fever.   HENT:  Negative for trouble swallowing.    Respiratory:  Negative for cough and shortness of breath.    Cardiovascular:  Negative for chest pain and leg swelling.   Gastrointestinal:  Negative for abdominal pain and blood in stool.   Genitourinary:  Negative for difficulty urinating and dysuria.   Musculoskeletal:  Negative for gait problem.   Skin:  Negative for pallor.   Neurological:  Negative for syncope and weakness.     Objective:        Vitals:    04/26/23 1105 04/26/23 1140   BP: (!) 140/90 132/82   BP Location: Left arm    Patient Position: Sitting    BP Method: Large (Manual)    Pulse: 66    Resp: 15    Temp: 97.3 °F (36.3 °C)    TempSrc: Temporal    SpO2: 95%    Weight: 77.3 kg (170 lb 6.4 oz)    Height: 5' 8" (1.727 m)        Body mass index is 25.91 kg/m².    Physical Exam  Constitutional:       General: She is not in acute distress.     Appearance: She is well-developed. She is not diaphoretic.   HENT:      Head: Normocephalic and atraumatic.      Right Ear: External ear normal.      Left Ear: External ear normal.   Eyes:      Conjunctiva/sclera: Conjunctivae normal.   Cardiovascular:      Rate and Rhythm: Normal rate and regular rhythm.      Heart sounds: Normal heart sounds.   Pulmonary:      Effort: Pulmonary effort is normal.      Breath sounds: Normal breath sounds.   Abdominal:      General: There is no distension.      Palpations: Abdomen is soft.   Musculoskeletal:      Cervical back: Neck supple.      Right lower leg: No edema.      Left lower leg: No edema.   Skin:     General: Skin is warm and dry.      Nails: There is no clubbing.   Neurological:      General: " No focal deficit present.      Mental Status: She is alert.   Psychiatric:         Behavior: Behavior normal.         Judgment: Judgment normal.       Assessment:     1. Encounter to establish care    2. Hypothyroidism, unspecified type    3. Essential hypertension    4. Anxiety state    5. GERD without esophagitis           Plan:         1. Encounter to establish care  - reviewed healthcare maintenance and pt's chronic medical problems.   - labs - personally reviewed  - immunizations reviewed    2. Hypothyroidism, unspecified type  - cont levothroxine    3. Essential hypertension  - controlled  - lisinopriL (PRINIVIL,ZESTRIL) 20 MG tablet; Take 1 tablet (20 mg total) by mouth once daily.  Dispense: 90 tablet; Refill: 1    4. Anxiety state  - intolerant of SSRI - lexapro had severe reactions. Doing well on clonazepam regimen per previous PCP, has even decreased qHS dose so will continue current regimen.  reviewed.   - clonazePAM (KLONOPIN) 0.5 MG tablet; Take 1 tablet (0.5 mg total) by mouth 3 (three) times daily as needed for Anxiety.  Dispense: 90 tablet; Refill: 5    5. GERD without esophagitis  - cont pantoprazole.      Previous [facility] labs, notes and/or records reviewed and considered for their impact on our clinical decision making today.    Independent interpretation of results completed by another healthcare professional.           Unless there are intervening problems, Follow up in about 6 months (around 10/26/2023), or if symptoms worsen or fail to improve, for annual.      Patient note was created using MModal Dictation.  Any errors in syntax or even information may not have been identified and edited on initial review prior to signing this note.    I spent a total of 41 minutes on the day of the visit.        This includes face to face time and non-face to face time preparing to see the patient (eg, review of tests), obtaining and/or reviewing separately obtained history, documenting clinical  information in the electronic or other health record, independently interpreting results and communicating results to the patient/family/caregiver, or care coordinator.

## 2023-09-12 DIAGNOSIS — F41.1 ANXIETY STATE: ICD-10-CM

## 2023-09-12 RX ORDER — CLONAZEPAM 0.5 MG/1
0.5 TABLET ORAL 3 TIMES DAILY PRN
Qty: 90 TABLET | Refills: 1 | Status: SHIPPED | OUTPATIENT
Start: 2023-09-12 | End: 2023-11-02 | Stop reason: SDUPTHER

## 2023-09-13 DIAGNOSIS — K21.9 GERD WITHOUT ESOPHAGITIS: ICD-10-CM

## 2023-09-13 RX ORDER — PANTOPRAZOLE SODIUM 20 MG/1
20 TABLET, DELAYED RELEASE ORAL DAILY
Qty: 30 TABLET | Refills: 6 | Status: SHIPPED | OUTPATIENT
Start: 2023-09-13 | End: 2024-02-05

## 2023-10-25 LAB
ALBUMIN SERPL BCP-MCNC: 4 G/DL (ref 3.4–5)
ALP SERPL-CCNC: 67 U/L (ref 45–117)
ALT SERPL W P-5'-P-CCNC: 28 U/L (ref 13–56)
AST SERPL-CCNC: 16 U/L (ref 15–37)
BILIRUB SERPL-MCNC: 0.5 MG/DL (ref 0.2–1)
BUN SERPL-MCNC: 19.76 MG/DL
CALCIUM SERPL-MCNC: 9.1 MG/DL (ref 8.5–10.1)
CHLORIDE SERPL-SCNC: 104 MMOL/L (ref 98–104)
CHOLEST SERPL-MSCNC: 184 MG/DL (ref 0–200)
CO2 SERPL-SCNC: 30 MMOL/L (ref 21–32)
GLUCOSE SERPL-MCNC: 90 MG/DL (ref 74–106)
HBA1C MFR BLD: 5.1 % (ref 4.2–6.3)
HDLC SERPL-MCNC: 68 MG/DL (ref 39–?)
LDLC SERPL CALC-MCNC: 99 MG/DL (ref ?–100)
POTASSIUM SERPL-SCNC: 4.2 MMOL/L (ref 3.5–5.1)
PROT SERPL-MCNC: 7.3 G/DL (ref 6.4–8.2)
SODIUM BLD-SCNC: 138 MMOL/L (ref 131–143)
TRIGL SERPL-MCNC: 86 MG/DL (ref ?–149)
TSH: 0.67 (ref 0.36–3.74)
VLDLC SERPL-MCNC: 17 MG/DL

## 2023-11-01 ENCOUNTER — TELEPHONE (OUTPATIENT)
Dept: FAMILY MEDICINE | Facility: CLINIC | Age: 78
End: 2023-11-01
Payer: MEDICARE

## 2023-11-01 DIAGNOSIS — K21.9 GERD WITHOUT ESOPHAGITIS: ICD-10-CM

## 2023-11-01 DIAGNOSIS — G47.00 INSOMNIA, UNSPECIFIED TYPE: ICD-10-CM

## 2023-11-01 DIAGNOSIS — Z79.899 OTHER LONG TERM (CURRENT) DRUG THERAPY: ICD-10-CM

## 2023-11-01 DIAGNOSIS — I10 ESSENTIAL HYPERTENSION: Primary | ICD-10-CM

## 2023-11-01 DIAGNOSIS — E78.5 HYPERLIPIDEMIA, UNSPECIFIED HYPERLIPIDEMIA TYPE: ICD-10-CM

## 2023-11-02 ENCOUNTER — OFFICE VISIT (OUTPATIENT)
Dept: FAMILY MEDICINE | Facility: CLINIC | Age: 78
End: 2023-11-02
Payer: MEDICARE

## 2023-11-02 VITALS
WEIGHT: 145.38 LBS | BODY MASS INDEX: 22.03 KG/M2 | HEIGHT: 68 IN | HEART RATE: 50 BPM | DIASTOLIC BLOOD PRESSURE: 67 MMHG | SYSTOLIC BLOOD PRESSURE: 129 MMHG | TEMPERATURE: 97 F | OXYGEN SATURATION: 97 %

## 2023-11-02 DIAGNOSIS — E03.9 HYPOTHYROIDISM, UNSPECIFIED TYPE: ICD-10-CM

## 2023-11-02 DIAGNOSIS — I10 ESSENTIAL HYPERTENSION: ICD-10-CM

## 2023-11-02 DIAGNOSIS — F41.1 ANXIETY STATE: ICD-10-CM

## 2023-11-02 DIAGNOSIS — Z00.00 ANNUAL PHYSICAL EXAM: Primary | ICD-10-CM

## 2023-11-02 PROCEDURE — 99215 OFFICE O/P EST HI 40 MIN: CPT | Mod: S$GLB,,, | Performed by: INTERNAL MEDICINE

## 2023-11-02 PROCEDURE — 99215 PR OFFICE/OUTPT VISIT, EST, LEVL V, 40-54 MIN: ICD-10-PCS | Mod: S$GLB,,, | Performed by: INTERNAL MEDICINE

## 2023-11-02 RX ORDER — LISINOPRIL 20 MG/1
20 TABLET ORAL DAILY
Qty: 90 TABLET | Refills: 1 | Status: SHIPPED | OUTPATIENT
Start: 2023-11-02 | End: 2023-11-02 | Stop reason: SDUPTHER

## 2023-11-02 RX ORDER — CLONAZEPAM 0.5 MG/1
0.5 TABLET ORAL 3 TIMES DAILY PRN
Qty: 90 TABLET | Refills: 3 | Status: SHIPPED | OUTPATIENT
Start: 2023-11-02 | End: 2024-02-19 | Stop reason: SDUPTHER

## 2023-11-02 RX ORDER — LISINOPRIL 20 MG/1
20 TABLET ORAL DAILY
Qty: 90 TABLET | Refills: 3 | Status: SHIPPED | OUTPATIENT
Start: 2023-11-02 | End: 2024-03-27 | Stop reason: SDUPTHER

## 2023-11-02 NOTE — PROGRESS NOTES
"Subjective:       Patient ID: Grace Arrieta is a 78 y.o. female.    Chief Complaint: Follow-up (Patient is here for a follow up visit and is needing a refill on her medication )    HPI    78 y.o. female presents for follow up of chronic medical problems:     Hypothyroidism: 1/2 tab of levothyroxine 125mcg q AM. Rx by endocrinology Dr. Mckeon- sees every 2 years so we check labs in interim.    HTN: lisinopril 20mg. BP 120s/60s at home, checks daily    Anxiety: doing well on clonazepam 0.5mg TID rather than bid plus 1 mg qhs. Feeling much better off of lexapro now.     Review of Systems   Constitutional:  Negative for diaphoresis, fever and unexpected weight change (intentional weight loss).   HENT:  Negative for trouble swallowing.    Respiratory:  Negative for cough and shortness of breath.    Cardiovascular:  Negative for chest pain and leg swelling.   Gastrointestinal:  Negative for abdominal pain and blood in stool.   Genitourinary:  Negative for difficulty urinating and dysuria.   Musculoskeletal:  Negative for gait problem.   Skin:  Negative for pallor.   Neurological:  Negative for syncope and weakness.       Objective:        Vitals:    11/02/23 0824   BP: 129/67   BP Location: Left arm   Patient Position: Sitting   Pulse: (!) 50   Temp: 97.1 °F (36.2 °C)   SpO2: 97%   Weight: 66 kg (145 lb 6.4 oz)   Height: 5' 8" (1.727 m)       Body mass index is 22.11 kg/m².    Physical Exam  Constitutional:       General: She is not in acute distress.     Appearance: She is well-developed. She is not diaphoretic.   HENT:      Head: Normocephalic and atraumatic.      Right Ear: External ear normal.      Left Ear: External ear normal.   Eyes:      Conjunctiva/sclera: Conjunctivae normal.   Cardiovascular:      Rate and Rhythm: Normal rate and regular rhythm.      Heart sounds: Normal heart sounds.   Pulmonary:      Effort: Pulmonary effort is normal.      Breath sounds: Normal breath sounds.   Abdominal:      General: " There is no distension.      Palpations: Abdomen is soft.   Musculoskeletal:      Cervical back: Neck supple.      Right lower leg: No edema.      Left lower leg: No edema.   Skin:     General: Skin is warm and dry.      Nails: There is no clubbing.   Neurological:      General: No focal deficit present.      Mental Status: She is alert. Mental status is at baseline.   Psychiatric:         Behavior: Behavior normal.         Judgment: Judgment normal.         Assessment:     1. Annual physical exam    2. Hypothyroidism, unspecified type    3. Essential hypertension    4. Anxiety state           Plan:         1. Annual physical exam  - labs reviewed w/ pt, given copy. Will request interface to chart  - immunizations reviewed w/ pt    2. Hypothyroidism, unspecified type  - controlled on current med dose    3. Essential hypertension  - controlled  - lisinopriL (PRINIVIL,ZESTRIL) 20 MG tablet; Take 1 tablet (20 mg total) by mouth once daily.  Dispense: 90 tablet; Refill: 3    4. Anxiety state  - doing well on current prn med w/o adverse effect.  reviewed. Rx sent.   - clonazePAM (KLONOPIN) 0.5 MG tablet; Take 1 tablet (0.5 mg total) by mouth 3 (three) times daily as needed for Anxiety.  Dispense: 90 tablet; Refill: 3      Unless there are intervening problems, Follow up in about 5 months (around 4/2/2024), or if symptoms worsen or fail to improve, for follow up.      Patient note was created using MModal Dictation.  Any errors in syntax or even information may not have been identified and edited on initial review prior to signing this note.    I spent a total of 40 minutes on the day of the visit.  This includes face to face time and non-face to face time preparing to see the patient (eg, review of tests), obtaining and/or reviewing separately obtained history, documenting clinical information in the electronic or other health record, independently interpreting results and communicating results to the  patient/family/caregiver, or care coordinator.

## 2023-11-07 ENCOUNTER — PATIENT OUTREACH (OUTPATIENT)
Dept: ADMINISTRATIVE | Facility: HOSPITAL | Age: 78
End: 2023-11-07
Payer: MEDICARE

## 2024-01-31 DIAGNOSIS — Z78.0 MENOPAUSE: ICD-10-CM

## 2024-02-05 DIAGNOSIS — K21.9 GERD WITHOUT ESOPHAGITIS: ICD-10-CM

## 2024-02-05 RX ORDER — PANTOPRAZOLE SODIUM 20 MG/1
20 TABLET, DELAYED RELEASE ORAL
Qty: 30 TABLET | Refills: 5 | Status: SHIPPED | OUTPATIENT
Start: 2024-02-05 | End: 2024-03-27 | Stop reason: SDUPTHER

## 2024-02-19 DIAGNOSIS — F41.1 ANXIETY STATE: ICD-10-CM

## 2024-02-20 RX ORDER — CLONAZEPAM 0.5 MG/1
0.5 TABLET ORAL 3 TIMES DAILY PRN
Qty: 90 TABLET | Refills: 1 | Status: SHIPPED | OUTPATIENT
Start: 2024-02-20 | End: 2024-03-27 | Stop reason: SDUPTHER

## 2024-03-06 DIAGNOSIS — Z78.0 MENOPAUSE: ICD-10-CM

## 2024-03-19 ENCOUNTER — TELEPHONE (OUTPATIENT)
Dept: FAMILY MEDICINE | Facility: CLINIC | Age: 79
End: 2024-03-19
Payer: MEDICARE

## 2024-03-19 ENCOUNTER — DOCUMENTATION ONLY (OUTPATIENT)
Dept: OBSTETRICS AND GYNECOLOGY | Facility: CLINIC | Age: 79
End: 2024-03-19
Payer: MEDICARE

## 2024-03-19 DIAGNOSIS — M79.643 PAIN OF HAND, UNSPECIFIED LATERALITY: Primary | ICD-10-CM

## 2024-03-19 NOTE — TELEPHONE ENCOUNTER
C/o Rt. Hand/wrist  pain getting worse.      Wants ref. To Dr. Camacho Ott at [place].  (Wants to see him at [facility]. Office).

## 2024-03-19 NOTE — TELEPHONE ENCOUNTER
----- Message from Dennise Scales sent at 3/19/2024 10:18 AM CDT -----  Regarding: referral  Contact: pt  Pt calling for referral for her hand to see Dr Ott on [facility] and she can be reached at [phone] or [phone].    Thanks,

## 2024-03-21 ENCOUNTER — TELEPHONE (OUTPATIENT)
Dept: FAMILY MEDICINE | Facility: CLINIC | Age: 79
End: 2024-03-21
Payer: MEDICARE

## 2024-03-21 NOTE — TELEPHONE ENCOUNTER
----- Message from Haider Zapien sent at 3/21/2024  8:30 AM CDT -----  Contact: esvin Edwards called to check status of referral that should have been faxed over to Dr. Ott office at [phone]. please call her back at [phone].      Thanks  DD

## 2024-03-27 ENCOUNTER — OFFICE VISIT (OUTPATIENT)
Dept: FAMILY MEDICINE | Facility: CLINIC | Age: 79
End: 2024-03-27
Payer: MEDICARE

## 2024-03-27 VITALS
WEIGHT: 131.19 LBS | SYSTOLIC BLOOD PRESSURE: 120 MMHG | OXYGEN SATURATION: 99 % | RESPIRATION RATE: 15 BRPM | HEART RATE: 50 BPM | BODY MASS INDEX: 19.88 KG/M2 | TEMPERATURE: 96 F | DIASTOLIC BLOOD PRESSURE: 80 MMHG | HEIGHT: 68 IN

## 2024-03-27 DIAGNOSIS — I10 ESSENTIAL HYPERTENSION: ICD-10-CM

## 2024-03-27 DIAGNOSIS — F41.1 ANXIETY STATE: ICD-10-CM

## 2024-03-27 DIAGNOSIS — K21.9 GERD WITHOUT ESOPHAGITIS: ICD-10-CM

## 2024-03-27 DIAGNOSIS — E03.9 HYPOTHYROIDISM, UNSPECIFIED TYPE: Primary | ICD-10-CM

## 2024-03-27 LAB
ALBUMIN SERPL BCP-MCNC: 4.1 G/DL (ref 3.4–5)
ALP SERPL-CCNC: 66 U/L (ref 45–117)
ALT SERPL W P-5'-P-CCNC: 22 U/L (ref 13–56)
ANION GAP SERPL CALC-SCNC: 1 MMOL/L (ref 3–11)
AST SERPL-CCNC: 18 U/L (ref 15–37)
BILIRUB SERPL-MCNC: 0.6 MG/DL (ref 0.2–1)
BUN SERPL-MCNC: 22 MG/DL (ref 7–18)
BUN/CREAT SERPL: 30.55 RATIO
CALCIUM SERPL-MCNC: 9.6 MG/DL (ref 8.5–10.1)
CHLORIDE SERPL-SCNC: 105 MMOL/L (ref 98–107)
CO2 SERPL-SCNC: 30 MMOL/L (ref 21–32)
CREAT SERPL-MCNC: 0.72 MG/DL (ref 0.55–1.02)
GFR ESTIMATION: > 60
GLUCOSE SERPL-MCNC: 91 MG/DL (ref 74–106)
POTASSIUM SERPL-SCNC: 4.4 MMOL/L (ref 3.5–5.1)
PROT SERPL-MCNC: 6.9 G/DL (ref 6.4–8.2)
SODIUM BLD-SCNC: 136 MMOL/L (ref 131–143)

## 2024-03-27 PROCEDURE — 99214 OFFICE O/P EST MOD 30 MIN: CPT | Mod: S$GLB,,, | Performed by: INTERNAL MEDICINE

## 2024-03-27 RX ORDER — PANTOPRAZOLE SODIUM 20 MG/1
20 TABLET, DELAYED RELEASE ORAL DAILY
Qty: 90 TABLET | Refills: 1 | Status: SHIPPED | OUTPATIENT
Start: 2024-03-27

## 2024-03-27 RX ORDER — LISINOPRIL 20 MG/1
20 TABLET ORAL DAILY
Qty: 90 TABLET | Refills: 3 | Status: SHIPPED | OUTPATIENT
Start: 2024-03-27

## 2024-03-27 RX ORDER — CLONAZEPAM 0.5 MG/1
0.5 TABLET ORAL 3 TIMES DAILY PRN
Qty: 90 TABLET | Refills: 4 | Status: SHIPPED | OUTPATIENT
Start: 2024-03-27

## 2024-03-27 NOTE — PROGRESS NOTES
"Subjective:       Patient ID: Grace Arrieta is a 79 y.o. female.    Chief Complaint: Follow-up (Pt. Here for 5mth f/u for HTN and Anxiety.  C/o constipation since losing wt., has to take 3 stool softeners a day.)    HPI  79 y.o. female presents for follow up of chronic medical problems:     Hypothyroidism: 1/2 tab of levothyroxine 125mcg q AM. Rx by endocrinology Dr. Mckeon- sees every 2 years so we check labs in interim- has apt in May w/ labs scheduled for him.     HTN: lisinopril 20mg. BP 120s/60s at home, checks daily     Anxiety: doing well on clonazepam 0.5mg TID rather than bid plus 1 mg qhs. Feeling much better off of lexapro now. requesting refills on clonazepam rx.     GERD: pantoprazole 20mg daily    Arthralgias: apt scheduled w/ Dr. Sudheer Ott for right hand/wrist pain. Plans to see Dr. Nate Ott for knee pain in the future.    Review of Systems   Constitutional:  Positive for activity change (walking less d/t knee pain). Negative for unexpected weight change (intentional).   Respiratory:  Negative for shortness of breath.    Cardiovascular:  Negative for chest pain.   Genitourinary:  Negative for decreased urine volume.   Musculoskeletal:  Positive for arthralgias.   Neurological:  Negative for syncope.   Psychiatric/Behavioral:  The patient is nervous/anxious.        Objective:        Vitals:    03/27/24 0855   BP: 120/80   BP Location: Left arm   Patient Position: Sitting   BP Method: Large (Manual)   Pulse: (!) 50   Resp: 15   Temp: 96.3 °F (35.7 °C)   TempSrc: Temporal   SpO2: 99%   Weight: 59.5 kg (131 lb 3.2 oz)   Height: 5' 8" (1.727 m)       Body mass index is 19.95 kg/m².    Physical Exam  Constitutional:       General: She is not in acute distress.     Appearance: She is well-developed. She is not diaphoretic.   HENT:      Head: Normocephalic and atraumatic.      Right Ear: External ear normal.      Left Ear: External ear normal.   Eyes:      Conjunctiva/sclera: Conjunctivae normal. "   Cardiovascular:      Rate and Rhythm: Normal rate and regular rhythm.   Pulmonary:      Effort: Pulmonary effort is normal.      Breath sounds: Normal breath sounds.   Abdominal:      General: There is no distension.      Palpations: Abdomen is soft.   Musculoskeletal:      Right lower leg: No edema.      Left lower leg: No edema.   Skin:     General: Skin is warm and dry.      Nails: There is no clubbing.   Neurological:      Mental Status: She is alert. Mental status is at baseline.   Psychiatric:         Behavior: Behavior normal.         Judgment: Judgment normal.         Assessment:     1. Hypothyroidism, unspecified type    2. Anxiety state    3. Essential hypertension    4. GERD without esophagitis           Plan:         1. Hypothyroidism, unspecified type  - per Endo    2. Anxiety state  - doing well on current prn med.  reviewed.  - clonazePAM (KLONOPIN) 0.5 MG tablet; Take 1 tablet (0.5 mg total) by mouth 3 (three) times daily as needed for Anxiety.  Dispense: 90 tablet; Refill: 4    3. Essential hypertension  - controlled  - lisinopriL (PRINIVIL,ZESTRIL) 20 MG tablet; Take 1 tablet (20 mg total) by mouth once daily.  Dispense: 90 tablet; Refill: 3  - Comprehensive Metabolic Panel; Future  - Comprehensive Metabolic Panel    4. GERD without esophagitis  - pantoprazole (PROTONIX) 20 MG tablet; Take 1 tablet (20 mg total) by mouth once daily.  Dispense: 90 tablet; Refill: 1      Unless there are intervening problems, Follow up in about 6 months (around 9/27/2024), or if symptoms worsen or fail to improve, for annual.      Patient note was created using MModal Dictation.  Any errors in syntax or even information may not have been identified and edited on initial review prior to signing this note.

## 2024-03-27 NOTE — PROGRESS NOTES
Please call patient with test results:  Electrolytes, liver and kidney function within good range.

## 2024-04-18 ENCOUNTER — TELEPHONE (OUTPATIENT)
Dept: FAMILY MEDICINE | Facility: CLINIC | Age: 79
End: 2024-04-18
Payer: MEDICARE

## 2024-04-18 NOTE — TELEPHONE ENCOUNTER
----- Message from Dennise Scales sent at 4/18/2024  9:47 AM CDT -----  Contact: pt  Pt calling wanting to if she needs have labs done before her appt in October and she can be reached at [phone]     Thanks,

## 2024-04-18 NOTE — TELEPHONE ENCOUNTER
Advised pt. She will need fasting labs prior to appt.  Pt. Will call back to jayne'd labs the week prior to appt.

## 2024-09-09 DIAGNOSIS — K21.9 GERD WITHOUT ESOPHAGITIS: ICD-10-CM

## 2024-09-09 NOTE — TELEPHONE ENCOUNTER
----- Message from Esha Rober sent at 9/9/2024  8:50 AM CDT -----  Contact: Grace  Type:  RX Refill Request    Who Called: Grace  Refill or New Rx: refill  RX Name and Strength:pantoprazole (PROTONIX) 20 MG tablet   How is the patient currently taking it? (ex. 1XDay):as prescribed  Is this a 30 day or 90 day RX:as prescribed  Preferred Pharmacy with phone number:  [facility] - [place], [place] - [address], [address]  [address], [address]  [facility] [address]  Phone: [phone] Fax: [phone]  Local or Mail Order:local  Ordering Provider:Dr. Philip   Would the patient rather a call back or a response via [facility]? Call  Best Call Back Number:[phone]   Additional Information: Patient request to discuss receiving prescription refill.   Thank you,  GH

## 2024-09-09 NOTE — TELEPHONE ENCOUNTER
----- Message from Esha Rober sent at 9/9/2024  8:50 AM CDT -----  Contact: Grace  Type:  RX Refill Request    Who Called: Grace  Refill or New Rx: refill  RX Name and Strength:pantoprazole (PROTONIX) 20 MG tablet   How is the patient currently taking it? (ex. 1XDay):as prescribed  Is this a 30 day or 90 day RX:as prescribed  Preferred Pharmacy with phone number:  [facility] - [place], [place] - [address], [address]  [address], [address]  [facility] [address]  Phone: [phone] Fax: [phone]  Local or Mail Order:local  Ordering Provider:Dr. Philip   Would the patient rather a call back or a response via [facility]? Call  Best Call Back Number:[phone]   Additional Information: Patient request to discuss receiving prescription refill.   Thank you,  GH

## 2024-09-11 DIAGNOSIS — K21.9 GERD WITHOUT ESOPHAGITIS: ICD-10-CM

## 2024-09-11 RX ORDER — PANTOPRAZOLE SODIUM 20 MG/1
20 TABLET, DELAYED RELEASE ORAL DAILY
Qty: 90 TABLET | Refills: 1 | Status: SHIPPED | OUTPATIENT
Start: 2024-09-11

## 2024-09-17 RX ORDER — PANTOPRAZOLE SODIUM 20 MG/1
20 TABLET, DELAYED RELEASE ORAL
Qty: 90 TABLET | Refills: 0 | Status: SHIPPED | OUTPATIENT
Start: 2024-09-17

## 2024-09-27 ENCOUNTER — TELEPHONE (OUTPATIENT)
Dept: FAMILY MEDICINE | Facility: CLINIC | Age: 79
End: 2024-09-27
Payer: MEDICARE

## 2024-09-27 NOTE — TELEPHONE ENCOUNTER
----- Message from Ledy Rincon sent at 9/27/2024 11:06 AM CDT -----  Type:  Patient Returning Call    Who Called:pt  Who Left Message for Patient:Rosibel  Does the patient know what this is regarding?:call  Would the patient rather a call back or a response via [facility]? call  Best Call Back Number:[phone]    Additional Information: returning missed call

## 2024-10-07 DIAGNOSIS — F41.1 ANXIETY STATE: ICD-10-CM

## 2024-10-08 RX ORDER — CLONAZEPAM 0.5 MG/1
0.5 TABLET ORAL
Qty: 90 TABLET | Refills: 0 | Status: SHIPPED | OUTPATIENT
Start: 2024-10-08

## 2024-10-09 ENCOUNTER — CLINICAL SUPPORT (OUTPATIENT)
Dept: FAMILY MEDICINE | Facility: CLINIC | Age: 79
End: 2024-10-09
Payer: MEDICARE

## 2024-10-09 DIAGNOSIS — Z23 NEED FOR INFLUENZA VACCINATION: Primary | ICD-10-CM

## 2024-10-09 LAB
25(OH)D3 SERPL-MCNC: 61 NG/ML
ABS NRBC COUNT: 0 THOU/UL (ref 0–0.01)
ABSOLUTE BASOPHIL: 0 10*3/UL (ref 0–0.3)
ABSOLUTE EOSINOPHIL: 0.1 10*3/UL (ref 0–0.6)
ABSOLUTE IMMATURE GRAN: 0.01 THOU/UL (ref 0–0.03)
ABSOLUTE LYMPHOCYTE: 2 10*3/UL (ref 1.2–4)
ABSOLUTE MONOCYTE: 0.6 10*3/UL (ref 0.1–0.8)
ALBUMIN SERPL BCP-MCNC: 4 G/DL (ref 3.4–5)
ALBUMIN/GLOBULIN RATIO: 1.4 RATIO (ref 1.1–1.8)
ALP SERPL-CCNC: 50 U/L (ref 45–117)
ALT SERPL W P-5'-P-CCNC: 32 U/L (ref 13–56)
ANION GAP SERPL CALC-SCNC: 2 MMOL/L (ref 3–11)
AST SERPL-CCNC: 20 U/L (ref 15–37)
BASOPHILS NFR BLD: 0.7 % (ref 0–3)
BILIRUB SERPL-MCNC: 0.6 MG/DL (ref 0.2–1)
BUN SERPL-MCNC: 23 MG/DL (ref 7–18)
BUN/CREAT SERPL: 28.39 RATIO
CALCIUM SERPL-MCNC: 9.4 MG/DL (ref 8.5–10.1)
CHLORIDE SERPL-SCNC: 105 MMOL/L (ref 98–107)
CHOLEST SERPL-MSCNC: 194 MG/DL
CO2 SERPL-SCNC: 31 MMOL/L (ref 21–32)
CREAT SERPL-MCNC: 0.81 MG/DL (ref 0.55–1.02)
EOSINOPHIL NFR BLD: 2 % (ref 0–6)
ERYTHROCYTE [DISTWIDTH] IN BLOOD BY AUTOMATED COUNT: 13.3 % (ref 0–15.5)
ESTIMATED AVG GLUCOSE: 101 MG/DL
GFR ESTIMATION: > 60
GLOBULIN: 2.9 G/DL (ref 2.3–3.5)
GLUCOSE SERPL-MCNC: 84 MG/DL (ref 74–106)
HBA1C MFR BLD: 5 % (ref 4.2–6.3)
HCT VFR BLD AUTO: 43.7 % (ref 37–47)
HDL/CHOLESTEROL RATIO: 2.3 RATIO
HDLC SERPL-MCNC: 86 MG/DL
HGB BLD-MCNC: 14.3 G/DL (ref 12–16)
IMMATURE GRANULOCYTES: 0.2 % (ref 0–0.43)
LDLC SERPL CALC-MCNC: 93.2 MG/DL
LYMPHOCYTES NFR BLD: 34.6 % (ref 20–45)
MCH RBC QN AUTO: 29.9 PG (ref 27–32)
MCHC RBC AUTO-ENTMCNC: 32.7 % (ref 32–36)
MCV RBC AUTO: 91.4 FL (ref 80–99)
MONOCYTES NFR BLD: 11 % (ref 2–10)
NEUTROPHILS # BLD AUTO: 2.9 10*3/UL (ref 1.4–7)
NEUTROPHILS NFR BLD: 51.5 % (ref 50–80)
NUCLEATED RED BLOOD CELLS: 0 % (ref 0–0.2)
PLATELETS: 316 10*3/UL (ref 130–400)
PMV BLD AUTO: 9.3 FL (ref 9.2–12.2)
POTASSIUM SERPL-SCNC: 4.1 MMOL/L (ref 3.5–5.1)
PROT SERPL-MCNC: 6.9 G/DL (ref 6.4–8.2)
RBC # BLD AUTO: 4.78 10*6/UL (ref 4.2–5.4)
SODIUM BLD-SCNC: 138 MMOL/L (ref 131–143)
T4 FREE SP9 P CHAL SERPL-SCNC: 1.1 NG/DL (ref 0.76–1.46)
TRIGL SERPL-MCNC: 74 MG/DL (ref 0–149)
TSH SERPL DL<=0.005 MIU/L-ACNC: 2.54 UIU/ML (ref 0.36–3.74)
VLDL CHOLESTEROL: 15 MG/DL
WBC # BLD: 5.6 10*3/UL (ref 4.5–10)

## 2024-10-09 PROCEDURE — G0008 ADMIN INFLUENZA VIRUS VAC: HCPCS | Mod: S$GLB,,, | Performed by: INTERNAL MEDICINE

## 2024-10-09 PROCEDURE — 90653 IIV ADJUVANT VACCINE IM: CPT | Mod: S$GLB,,, | Performed by: INTERNAL MEDICINE

## 2024-10-30 ENCOUNTER — OFFICE VISIT (OUTPATIENT)
Dept: FAMILY MEDICINE | Facility: CLINIC | Age: 79
End: 2024-10-30
Payer: MEDICARE

## 2024-10-30 VITALS
OXYGEN SATURATION: 99 % | WEIGHT: 123.19 LBS | DIASTOLIC BLOOD PRESSURE: 76 MMHG | TEMPERATURE: 97 F | BODY MASS INDEX: 18.67 KG/M2 | HEART RATE: 51 BPM | HEIGHT: 68 IN | SYSTOLIC BLOOD PRESSURE: 120 MMHG | RESPIRATION RATE: 15 BRPM

## 2024-10-30 DIAGNOSIS — I10 ESSENTIAL HYPERTENSION: ICD-10-CM

## 2024-10-30 DIAGNOSIS — I70.0 AORTIC ATHEROSCLEROSIS: ICD-10-CM

## 2024-10-30 DIAGNOSIS — E03.9 HYPOTHYROIDISM, UNSPECIFIED TYPE: ICD-10-CM

## 2024-10-30 DIAGNOSIS — F41.1 ANXIETY STATE: ICD-10-CM

## 2024-10-30 DIAGNOSIS — K21.9 GERD WITHOUT ESOPHAGITIS: ICD-10-CM

## 2024-10-30 DIAGNOSIS — Z00.00 ANNUAL PHYSICAL EXAM: Primary | ICD-10-CM

## 2024-10-30 PROCEDURE — G2211 COMPLEX E/M VISIT ADD ON: HCPCS | Mod: S$GLB,,, | Performed by: INTERNAL MEDICINE

## 2024-10-30 PROCEDURE — 99215 OFFICE O/P EST HI 40 MIN: CPT | Mod: S$GLB,,, | Performed by: INTERNAL MEDICINE

## 2024-10-30 RX ORDER — LEVOTHYROXINE SODIUM 125 UG/1
TABLET ORAL
Qty: 45 TABLET | Refills: 3 | Status: SHIPPED | OUTPATIENT
Start: 2024-10-30

## 2024-10-30 RX ORDER — CLOBETASOL PROPIONATE 0.5 MG/G
CREAM TOPICAL
COMMUNITY
Start: 2024-05-10

## 2024-10-30 RX ORDER — CLONAZEPAM 0.5 MG/1
TABLET ORAL
Qty: 90 TABLET | Refills: 5 | Status: SHIPPED | OUTPATIENT
Start: 2024-10-30

## 2024-10-30 RX ORDER — PANTOPRAZOLE SODIUM 20 MG/1
20 TABLET, DELAYED RELEASE ORAL DAILY
Qty: 90 TABLET | Refills: 3 | Status: SHIPPED | OUTPATIENT
Start: 2024-10-30

## 2024-10-30 RX ORDER — LISINOPRIL 20 MG/1
20 TABLET ORAL DAILY
Qty: 90 TABLET | Refills: 3 | Status: SHIPPED | OUTPATIENT
Start: 2024-10-30

## 2024-10-30 RX ORDER — DICLOFENAC SODIUM 75 MG/1
TABLET, DELAYED RELEASE ORAL
COMMUNITY
Start: 2024-10-22

## 2024-10-30 RX ORDER — GLUCOSAMINE/CHONDRO SU A 500-400 MG
1 TABLET ORAL 2 TIMES DAILY
COMMUNITY

## 2025-01-23 NOTE — TELEPHONE ENCOUNTER
----- Message from Julia Maguire sent at 2/19/2024  2:22 PM CST -----  Contact: self  Type:  RX Refill Request    Who Called:  Grace Arrieta   Refill or New Rx: REFILL   RX Name and Strength: clonazePAM (KLONOPIN) 0.5 MG tablet  How is the patient currently taking it? (ex. 1XDay): 1 TABLET 3X A DAY AS NEEDED  Is this a 30 day or 90 day RX: 30 DAY    Preferred Pharmacy with phone number:   [facility] [address], [address]  [address], [address] [address]  Phone: [phone] Fax: [phone]     Local or Mail Order: LOCAL  Ordering Provider: XI MCCOLLUM  Would the patient rather a call back or a response via [facility]? CALL BACK  Best Call Back Number: [phone]   Additional Information: N/A    
23-Jan-2025

## 2025-02-25 ENCOUNTER — TELEPHONE (OUTPATIENT)
Facility: CLINIC | Age: 80
End: 2025-02-25
Payer: MEDICARE

## 2025-02-25 DIAGNOSIS — I10 ESSENTIAL HYPERTENSION: Primary | ICD-10-CM

## 2025-02-25 DIAGNOSIS — E03.9 HYPOTHYROIDISM, UNSPECIFIED TYPE: ICD-10-CM

## 2025-02-25 NOTE — TELEPHONE ENCOUNTER
Patient showed up at the clinic asking if she will need labs prior to her appointment on April 30th.   I saw that she did annual labs in October 2024

## 2025-02-25 NOTE — TELEPHONE ENCOUNTER
Patient has been notified of labs prior to her appointment on APRIL 30th   I told her that she did not have to be fasting so she could do them at the time of her appointment but she wanted to do them before so she could ask Doctor Yusra questions.   I told her that was fine and we scheduled her lab appointment for April 28th

## 2025-02-27 ENCOUNTER — TELEPHONE (OUTPATIENT)
Facility: CLINIC | Age: 80
End: 2025-02-27
Payer: MEDICARE

## 2025-02-27 DIAGNOSIS — G47.00 INSOMNIA, UNSPECIFIED TYPE: Primary | ICD-10-CM

## 2025-02-27 NOTE — TELEPHONE ENCOUNTER
This patient just came to the clinic complaining that her sleeping meds are NOT working. She hasn't slept since Monday and when she takes the medication is makes her feel tingly like she's burning on the inside. She has an appt in April but was wondering if she could get seen a lot sooner.

## 2025-02-27 NOTE — TELEPHONE ENCOUNTER
----- Message from Med Assistant Aurora sent at 2/27/2025  8:30 AM CST -----  Regarding: Patient has concerns about sleeping meds  This patient just came to the clinic complaining that her sleeping meds are working. She hasn't slept since Monday and when she takes the medication is makes her feel tingly like she's burning on the inside. She has an appt in April but was wondering if she could get seen a lot sooner. She's older and doesn't do MyChart so a virtual is out of the question.

## 2025-03-05 RX ORDER — TRAZODONE HYDROCHLORIDE 50 MG/1
50 TABLET ORAL NIGHTLY PRN
Qty: 30 TABLET | Refills: 2 | Status: SHIPPED | OUTPATIENT
Start: 2025-03-05

## 2025-03-05 NOTE — TELEPHONE ENCOUNTER
New rx for insomnia sent to her pharmacy. She can go ahead and try this one out now and we can discuss response at her scheduled apt.

## 2025-03-06 NOTE — TELEPHONE ENCOUNTER
Patient states that she does not want to take the new medication and she wants to discuss at her next appointment in April.   She also said that she may try it but she is nervous because she Google some symptoms that it could cause and it freaks her out. I told her that she doesn't need to look  up stuff on google. She said I know its hard though. She also said that she has a lot of other questions but she will discuss at her next appointment.

## 2025-04-28 ENCOUNTER — LAB VISIT (OUTPATIENT)
Facility: CLINIC | Age: 80
End: 2025-04-28
Payer: MEDICARE

## 2025-04-28 DIAGNOSIS — I10 ESSENTIAL HYPERTENSION: ICD-10-CM

## 2025-04-28 DIAGNOSIS — Z01.89 ROUTINE LAB DRAW: Primary | ICD-10-CM

## 2025-04-28 DIAGNOSIS — E03.9 HYPOTHYROIDISM, UNSPECIFIED TYPE: ICD-10-CM

## 2025-04-28 PROCEDURE — 36415 COLL VENOUS BLD VENIPUNCTURE: CPT | Mod: S$GLB,,, | Performed by: INTERNAL MEDICINE

## 2025-04-30 ENCOUNTER — OFFICE VISIT (OUTPATIENT)
Facility: CLINIC | Age: 80
End: 2025-04-30
Payer: MEDICARE

## 2025-04-30 VITALS
BODY MASS INDEX: 18.34 KG/M2 | RESPIRATION RATE: 16 BRPM | HEIGHT: 68 IN | WEIGHT: 121 LBS | OXYGEN SATURATION: 98 % | SYSTOLIC BLOOD PRESSURE: 124 MMHG | DIASTOLIC BLOOD PRESSURE: 70 MMHG | HEART RATE: 75 BPM

## 2025-04-30 DIAGNOSIS — R92.8 ABNORMALITY OF LEFT BREAST ON SCREENING MAMMOGRAM: ICD-10-CM

## 2025-04-30 DIAGNOSIS — E78.5 HYPERLIPIDEMIA, UNSPECIFIED HYPERLIPIDEMIA TYPE: ICD-10-CM

## 2025-04-30 DIAGNOSIS — G47.00 INSOMNIA, UNSPECIFIED TYPE: ICD-10-CM

## 2025-04-30 DIAGNOSIS — F41.1 ANXIETY STATE: ICD-10-CM

## 2025-04-30 DIAGNOSIS — I10 ESSENTIAL HYPERTENSION: ICD-10-CM

## 2025-04-30 DIAGNOSIS — R79.9 ABNORMAL FINDING OF BLOOD CHEMISTRY, UNSPECIFIED: ICD-10-CM

## 2025-04-30 DIAGNOSIS — Z00.00 ANNUAL PHYSICAL EXAM: Primary | ICD-10-CM

## 2025-04-30 DIAGNOSIS — G25.81 RESTLESS LEG SYNDROME: ICD-10-CM

## 2025-04-30 DIAGNOSIS — E03.9 HYPOTHYROIDISM, UNSPECIFIED TYPE: Primary | ICD-10-CM

## 2025-04-30 DIAGNOSIS — E03.9 HYPOTHYROIDISM, UNSPECIFIED TYPE: ICD-10-CM

## 2025-04-30 PROCEDURE — 99214 OFFICE O/P EST MOD 30 MIN: CPT | Mod: S$GLB,,, | Performed by: INTERNAL MEDICINE

## 2025-04-30 RX ORDER — CLONAZEPAM 0.5 MG/1
TABLET ORAL
Qty: 90 TABLET | Refills: 5 | Status: SHIPPED | OUTPATIENT
Start: 2025-04-30

## 2025-04-30 RX ORDER — ROPINIROLE 0.25 MG/1
0.25 TABLET, FILM COATED ORAL NIGHTLY PRN
Qty: 30 TABLET | Refills: 2 | Status: SHIPPED | OUTPATIENT
Start: 2025-04-30

## 2025-04-30 RX ORDER — TRAZODONE HYDROCHLORIDE 50 MG/1
50 TABLET ORAL NIGHTLY PRN
Qty: 90 TABLET | Refills: 1 | Status: SHIPPED | OUTPATIENT
Start: 2025-04-30

## 2025-04-30 RX ORDER — LEVOTHYROXINE SODIUM 125 UG/1
TABLET ORAL
Qty: 45 TABLET | Refills: 3 | Status: SHIPPED | OUTPATIENT
Start: 2025-04-30

## 2025-04-30 NOTE — PROGRESS NOTES
"Subjective:       Patient ID: Grace Arrieta is a 80 y.o. female.    Chief Complaint: Follow-up (She is concerned about her mammogram results )    HPI    80 y.o. female presents for follow up of chronic medical problems:     Hypothyroidism: 1/2 tab of levothyroxine 125mcg q AM. Follows w/ endo every 2 years- was seeing Dr. Mckeon but will now see Dr. Mercedes- next apt is May 2026.     HTN: lisinopril 20mg. BP 120s/60s at home, checks daily     Anxiety: doing well on clonazepam 0.5mg TID prn. Feeling much better off of lexapro. requesting refills on clonazepam rx.      Insomnia: trazodone 50mg qhs prn     GERD: pantoprazole 20mg daily    Review of Systems   Constitutional:  Negative for fever.   HENT:  Positive for dental problem (seeing dentist tomorrow) and ear pain (intermittent, brief).    Respiratory:  Negative for shortness of breath.    Cardiovascular:  Negative for chest pain.   Gastrointestinal:  Negative for abdominal pain.   Genitourinary:  Negative for decreased urine volume.   Musculoskeletal:  Negative for gait problem.        + restless leg symptoms intermittently at night   Neurological:  Negative for syncope.   Psychiatric/Behavioral:  Positive for sleep disturbance (recently experienced restless leg symptoms). The patient is nervous/anxious.        Objective:        Vitals:    04/30/25 0823 04/30/25 0900   BP: (!) 140/72 124/70   BP Location: Left arm    Patient Position: Sitting    Pulse: 75    Resp: 16    SpO2: 98%    Weight: 54.9 kg (121 lb)    Height: 5' 8" (1.727 m)        Body mass index is 18.4 kg/m².    Physical Exam  Constitutional:       General: She is not in acute distress.     Appearance: She is well-developed.   HENT:      Head: Normocephalic and atraumatic.      Right Ear: External ear normal.      Left Ear: External ear normal.   Cardiovascular:      Rate and Rhythm: Normal rate.   Pulmonary:      Effort: Pulmonary effort is normal. No respiratory distress.   Skin:     General: " Skin is warm and dry.   Neurological:      Mental Status: She is alert. Mental status is at baseline.   Psychiatric:         Mood and Affect: Mood is anxious.         Behavior: Behavior normal.       Assessment:     1. Hypothyroidism, unspecified type    2. Essential hypertension    3. Anxiety state    4. Insomnia, unspecified type    5. Abnormality of left breast on screening mammogram    6. Restless leg syndrome           Plan:         1. Hypothyroidism, unspecified type  - reviewed lab results w/ pt. Tsh and ft4 wnl.  - levothyroxine (SYNTHROID) 125 MCG tablet; 1/2 tablet daily  Dispense: 45 tablet; Refill: 3    2. Essential hypertension  - controlled on lisinopril- continue    3. Anxiety state  - doing well w/ prn med. Rx sent.   - clonazePAM (KLONOPIN) 0.5 MG tablet; TAKE 1 TABLET BY MOUTH THREE TIMES DAILY AS NEEDED FOR ANXIETY  Dispense: 90 tablet; Refill: 5    4. Insomnia, unspecified type    - traZODone (DESYREL) 50 MG tablet; Take 1 tablet (50 mg total) by mouth nightly as needed for Insomnia.  Dispense: 90 tablet; Refill: 1    5. Abnormality of left breast on screening mammogram  - reviewed results w/ pt. Obtain diagnostic imaging for further eval.   - Mammo Digital Diagnostic Left with [facility]; Future  - US Breast Left Limited; Future  - US Breast Left Limited  - Mammo Digital Diagnostic Left with [facility]    6. Restless leg syndrome    - rOPINIRole (REQUIP) 0.25 MG tablet; Take 1 tablet (0.25 mg total) by mouth nightly as needed (restless leg).  Dispense: 30 tablet; Refill: 2    Results (3+) reviewed w/ pt.  Mail copy of path lab results once internet working.     Unless there are intervening problems, Follow up in about 6 months (around 10/30/2025), or if symptoms worsen or fail to improve, for annual, follow up, HTN.      Patient note was created using M[employer] Dictation.  Any errors in syntax or even information may not have been identified and edited on initial review prior to signing this note.

## 2025-05-14 ENCOUNTER — TELEPHONE (OUTPATIENT)
Facility: CLINIC | Age: 80
End: 2025-05-14
Payer: MEDICARE

## 2025-05-14 NOTE — TELEPHONE ENCOUNTER
----- Message from Stuart Quintanilla sent at 5/13/2025  4:00 PM CDT -----  DX MAMMO AND US IN CHART READY FOR YOU TO REVIEW

## 2025-05-14 NOTE — TELEPHONE ENCOUNTER
Please inform patient of test results:   Diagnostic mammo and ultrasound of left breast shows no evidence of malignancy. Return to annual screening mammograms, next due April 2026.